# Patient Record
Sex: FEMALE | Race: WHITE | Employment: FULL TIME | URBAN - METROPOLITAN AREA
[De-identification: names, ages, dates, MRNs, and addresses within clinical notes are randomized per-mention and may not be internally consistent; named-entity substitution may affect disease eponyms.]

---

## 2023-07-30 ENCOUNTER — APPOINTMENT (OUTPATIENT)
Dept: CT IMAGING | Age: 63
End: 2023-07-30
Payer: COMMERCIAL

## 2023-07-30 ENCOUNTER — HOSPITAL ENCOUNTER (OUTPATIENT)
Age: 63
Setting detail: OBSERVATION
Discharge: HOME | End: 2023-07-31
Attending: EMERGENCY MEDICINE | Admitting: INTERNAL MEDICINE
Payer: COMMERCIAL

## 2023-07-30 DIAGNOSIS — R00.2 PALPITATIONS: ICD-10-CM

## 2023-07-30 DIAGNOSIS — R55 SYNCOPE AND COLLAPSE: Primary | ICD-10-CM

## 2023-07-30 LAB
ALBUMIN SERPL-MCNC: 4 G/DL (ref 3.4–5)
ALBUMIN/GLOB SERPL: 1.4 {RATIO} (ref 1.1–2.2)
ALP SERPL-CCNC: 94 U/L (ref 40–129)
ALT SERPL-CCNC: 10 U/L (ref 10–40)
ANION GAP SERPL CALCULATED.3IONS-SCNC: 10 MMOL/L (ref 3–16)
AST SERPL-CCNC: 16 U/L (ref 15–37)
BASOPHILS # BLD: 0 K/UL (ref 0–0.2)
BASOPHILS NFR BLD: 0.3 %
BILIRUB SERPL-MCNC: 0.6 MG/DL (ref 0–1)
BUN SERPL-MCNC: 18 MG/DL (ref 7–20)
CALCIUM SERPL-MCNC: 8.9 MG/DL (ref 8.3–10.6)
CHLORIDE SERPL-SCNC: 105 MMOL/L (ref 99–110)
CO2 SERPL-SCNC: 24 MMOL/L (ref 21–32)
CREAT SERPL-MCNC: 0.7 MG/DL (ref 0.6–1.2)
D DIMER: 0.63 UG/ML FEU (ref 0–0.6)
DEPRECATED RDW RBC AUTO: 13.2 % (ref 12.4–15.4)
EOSINOPHIL # BLD: 0 K/UL (ref 0–0.6)
EOSINOPHIL NFR BLD: 0.2 %
GFR SERPLBLD CREATININE-BSD FMLA CKD-EPI: >60 ML/MIN/{1.73_M2}
GLUCOSE SERPL-MCNC: 148 MG/DL (ref 70–99)
HCT VFR BLD AUTO: 45.3 % (ref 36–48)
HGB BLD-MCNC: 15.2 G/DL (ref 12–16)
LYMPHOCYTES # BLD: 1.8 K/UL (ref 1–5.1)
LYMPHOCYTES NFR BLD: 15.5 %
MCH RBC QN AUTO: 29.7 PG (ref 26–34)
MCHC RBC AUTO-ENTMCNC: 33.6 G/DL (ref 31–36)
MCV RBC AUTO: 88.4 FL (ref 80–100)
MONOCYTES # BLD: 0.8 K/UL (ref 0–1.3)
MONOCYTES NFR BLD: 6.6 %
NEUTROPHILS # BLD: 8.8 K/UL (ref 1.7–7.7)
NEUTROPHILS NFR BLD: 77.4 %
NT-PROBNP SERPL-MCNC: 889 PG/ML (ref 0–124)
PLATELET # BLD AUTO: 308 K/UL (ref 135–450)
PMV BLD AUTO: 8.7 FL (ref 5–10.5)
POTASSIUM SERPL-SCNC: 4.2 MMOL/L (ref 3.5–5.1)
PROT SERPL-MCNC: 6.8 G/DL (ref 6.4–8.2)
RBC # BLD AUTO: 5.13 M/UL (ref 4–5.2)
SODIUM SERPL-SCNC: 139 MMOL/L (ref 136–145)
TROPONIN, HIGH SENSITIVITY: 7 NG/L (ref 0–14)
TSH SERPL DL<=0.005 MIU/L-ACNC: 3.6 UIU/ML (ref 0.27–4.2)
WBC # BLD AUTO: 11.3 K/UL (ref 4–11)

## 2023-07-30 PROCEDURE — 84484 ASSAY OF TROPONIN QUANT: CPT

## 2023-07-30 PROCEDURE — 99285 EMERGENCY DEPT VISIT HI MDM: CPT

## 2023-07-30 PROCEDURE — G0378 HOSPITAL OBSERVATION PER HR: HCPCS

## 2023-07-30 PROCEDURE — 96361 HYDRATE IV INFUSION ADD-ON: CPT

## 2023-07-30 PROCEDURE — 71260 CT THORAX DX C+: CPT

## 2023-07-30 PROCEDURE — 6360000004 HC RX CONTRAST MEDICATION: Performed by: EMERGENCY MEDICINE

## 2023-07-30 PROCEDURE — 84443 ASSAY THYROID STIM HORMONE: CPT

## 2023-07-30 PROCEDURE — 96360 HYDRATION IV INFUSION INIT: CPT

## 2023-07-30 PROCEDURE — 6360000002 HC RX W HCPCS: Performed by: INTERNAL MEDICINE

## 2023-07-30 PROCEDURE — 93005 ELECTROCARDIOGRAM TRACING: CPT | Performed by: EMERGENCY MEDICINE

## 2023-07-30 PROCEDURE — 80053 COMPREHEN METABOLIC PANEL: CPT

## 2023-07-30 PROCEDURE — 83036 HEMOGLOBIN GLYCOSYLATED A1C: CPT

## 2023-07-30 PROCEDURE — 85025 COMPLETE CBC W/AUTO DIFF WBC: CPT

## 2023-07-30 PROCEDURE — 83880 ASSAY OF NATRIURETIC PEPTIDE: CPT

## 2023-07-30 PROCEDURE — 2580000003 HC RX 258: Performed by: INTERNAL MEDICINE

## 2023-07-30 PROCEDURE — 96372 THER/PROPH/DIAG INJ SC/IM: CPT

## 2023-07-30 PROCEDURE — 85379 FIBRIN DEGRADATION QUANT: CPT

## 2023-07-30 RX ORDER — ONDANSETRON 2 MG/ML
4 INJECTION INTRAMUSCULAR; INTRAVENOUS EVERY 6 HOURS PRN
Status: DISCONTINUED | OUTPATIENT
Start: 2023-07-30 | End: 2023-07-31 | Stop reason: HOSPADM

## 2023-07-30 RX ORDER — ENOXAPARIN SODIUM 100 MG/ML
30 INJECTION SUBCUTANEOUS 2 TIMES DAILY
Status: DISCONTINUED | OUTPATIENT
Start: 2023-07-30 | End: 2023-07-31 | Stop reason: HOSPADM

## 2023-07-30 RX ORDER — SODIUM CHLORIDE, SODIUM LACTATE, POTASSIUM CHLORIDE, CALCIUM CHLORIDE 600; 310; 30; 20 MG/100ML; MG/100ML; MG/100ML; MG/100ML
INJECTION, SOLUTION INTRAVENOUS CONTINUOUS
Status: DISCONTINUED | OUTPATIENT
Start: 2023-07-30 | End: 2023-07-31 | Stop reason: HOSPADM

## 2023-07-30 RX ORDER — POLYETHYLENE GLYCOL 3350 17 G/17G
17 POWDER, FOR SOLUTION ORAL DAILY PRN
Status: DISCONTINUED | OUTPATIENT
Start: 2023-07-30 | End: 2023-07-31 | Stop reason: HOSPADM

## 2023-07-30 RX ORDER — ONDANSETRON 4 MG/1
4 TABLET, ORALLY DISINTEGRATING ORAL EVERY 8 HOURS PRN
Status: DISCONTINUED | OUTPATIENT
Start: 2023-07-30 | End: 2023-07-31 | Stop reason: HOSPADM

## 2023-07-30 RX ORDER — SODIUM CHLORIDE 9 MG/ML
INJECTION, SOLUTION INTRAVENOUS PRN
Status: DISCONTINUED | OUTPATIENT
Start: 2023-07-30 | End: 2023-07-31 | Stop reason: HOSPADM

## 2023-07-30 RX ORDER — ACETAMINOPHEN 325 MG/1
650 TABLET ORAL EVERY 6 HOURS PRN
Status: DISCONTINUED | OUTPATIENT
Start: 2023-07-30 | End: 2023-07-31 | Stop reason: HOSPADM

## 2023-07-30 RX ORDER — ACETAMINOPHEN 650 MG/1
650 SUPPOSITORY RECTAL EVERY 6 HOURS PRN
Status: DISCONTINUED | OUTPATIENT
Start: 2023-07-30 | End: 2023-07-31 | Stop reason: HOSPADM

## 2023-07-30 RX ORDER — SODIUM CHLORIDE 0.9 % (FLUSH) 0.9 %
5-40 SYRINGE (ML) INJECTION PRN
Status: DISCONTINUED | OUTPATIENT
Start: 2023-07-30 | End: 2023-07-31 | Stop reason: HOSPADM

## 2023-07-30 RX ORDER — LISINOPRIL AND HYDROCHLOROTHIAZIDE 25; 20 MG/1; MG/1
1 TABLET ORAL DAILY
COMMUNITY
Start: 2023-07-16

## 2023-07-30 RX ORDER — SODIUM CHLORIDE 0.9 % (FLUSH) 0.9 %
5-40 SYRINGE (ML) INJECTION EVERY 12 HOURS SCHEDULED
Status: DISCONTINUED | OUTPATIENT
Start: 2023-07-30 | End: 2023-07-31 | Stop reason: HOSPADM

## 2023-07-30 RX ADMIN — IOPAMIDOL 75 ML: 755 INJECTION, SOLUTION INTRAVENOUS at 11:18

## 2023-07-30 RX ADMIN — SODIUM CHLORIDE, POTASSIUM CHLORIDE, SODIUM LACTATE AND CALCIUM CHLORIDE: 600; 310; 30; 20 INJECTION, SOLUTION INTRAVENOUS at 15:37

## 2023-07-30 RX ADMIN — ENOXAPARIN SODIUM 30 MG: 100 INJECTION SUBCUTANEOUS at 21:39

## 2023-07-30 ASSESSMENT — PAIN - FUNCTIONAL ASSESSMENT: PAIN_FUNCTIONAL_ASSESSMENT: NONE - DENIES PAIN

## 2023-07-30 NOTE — ED NOTES
ED SBAR report provider to Adriel Hwang, [address] [place] [address]. Patient to be transported to Room 4125 via stretcher by ED tech. Patient transported with bedside cardiac monitor. IV site clean, dry, and intact. MEWS score and pain assessed and documented. Patient's score on the KNIGHT Fall scale reviewed with receiving RN. Updated patient and family on plan of care.       Gabrielle Brice RN  07/30/23 0199

## 2023-07-30 NOTE — ED TRIAGE NOTES
Pt arrived from home via private vehicle after a syncopal episode at home. Pt denies falling. Pt states she felt faint and sat down, her  witnessed. Hx HTN, did not take her lisinopril yes today. A&Ox4, ambulatory. Respirations even, easy, unlabored. Pt states she felt like her heart was racing last night but not this morning.

## 2023-07-30 NOTE — PROGRESS NOTES
Admitted patient to room  from the emergency department. Oriented to room, call light, tv, phone and dietary services. Respirations easy unlabored, denies pain. Bed in lowest position and locked. Exit alarms in place. Non slip socks on. ID bracelet on and correct per patient verbally reporting name and date of birth. Call light and needed items in reach.

## 2023-07-30 NOTE — ED NOTES
SBAR received from Belkys Carrasco RN. Assumed care of pt for lunch relief.        Giovanny Steward RN  07/30/23 0796

## 2023-07-30 NOTE — ED PROVIDER NOTES
[facility] ED    CHIEF COMPLAINT  Loss of Consciousness (Syncopal episode in the bathroom, pt denies falling)       HISTORY OF PRESENT ILLNESS  Gilbert Burt is a 63 y.o. female who presents to the ED for evaluation after a syncopal episode. Patient reports sensation of palpitations last night. Awoke and still had the sense of palpitations. Took a shower. While in the shower, felt as if she might pass out. Got out of the shower, called her  for help. Lost consciousness and was assisted to the ground by her . Denies head injury, neck pain, weakness, numbness. She denies any chest pain, shortness of breath, nausea, vomiting, diarrhea, abdominal pain, leg swelling, or calf pain. She thinks she has remained well-hydrated. She had a previous syncopal episode last year while in [employer]. Attributed it to being warm in [employer] and standing. History of hypertension. Denies any other cardiac history. I have reviewed the following from the nursing documentation:    Past Medical History:   Diagnosis Date    Hypertension      History reviewed. No pertinent surgical history. History reviewed. No pertinent family history.   Social History     Socioeconomic History    Marital status:      Spouse name: Not on file    Number of children: Not on file    Years of education: Not on file    Highest education level: Not on file   Occupational History    Not on file   Tobacco Use    Smoking status: Never    Smokeless tobacco: Never   Substance and Sexual Activity    Alcohol use: Yes     Comment: rarely    Drug use: Never    Sexual activity: Not on file   Other Topics Concern    Not on file   Social History Narrative    Not on file     Social Determinants of Health     Financial Resource Strain: Not on file   Food Insecurity: Not on file   Transportation Needs: Not on file   Physical Activity: Not on file   Stress: Not on file   Social Connections: Not on file   Intimate Partner Violence: Not on file

## 2023-07-31 ENCOUNTER — TELEPHONE (OUTPATIENT)
Dept: CARDIOLOGY CLINIC | Age: 63
End: 2023-07-31

## 2023-07-31 VITALS
OXYGEN SATURATION: 96 % | DIASTOLIC BLOOD PRESSURE: 79 MMHG | RESPIRATION RATE: 16 BRPM | HEART RATE: 72 BPM | SYSTOLIC BLOOD PRESSURE: 133 MMHG | TEMPERATURE: 98.2 F | WEIGHT: 231.92 LBS

## 2023-07-31 LAB
ALBUMIN SERPL-MCNC: 3.4 G/DL (ref 3.4–5)
ANION GAP SERPL CALCULATED.3IONS-SCNC: 5 MMOL/L (ref 3–16)
BASOPHILS # BLD: 0 K/UL (ref 0–0.2)
BASOPHILS NFR BLD: 0.1 %
BUN SERPL-MCNC: 21 MG/DL (ref 7–20)
CALCIUM SERPL-MCNC: 8.9 MG/DL (ref 8.3–10.6)
CHLORIDE SERPL-SCNC: 104 MMOL/L (ref 99–110)
CO2 SERPL-SCNC: 29 MMOL/L (ref 21–32)
CREAT SERPL-MCNC: 0.7 MG/DL (ref 0.6–1.2)
DEPRECATED RDW RBC AUTO: 13.2 % (ref 12.4–15.4)
EKG ATRIAL RATE: 70 BPM
EKG DIAGNOSIS: NORMAL
EKG P AXIS: 0 DEGREES
EKG P-R INTERVAL: 134 MS
EKG Q-T INTERVAL: 354 MS
EKG QRS DURATION: 90 MS
EKG QTC CALCULATION (BAZETT): 382 MS
EKG R AXIS: 41 DEGREES
EKG T AXIS: 9 DEGREES
EKG VENTRICULAR RATE: 70 BPM
EOSINOPHIL # BLD: 0 K/UL (ref 0–0.6)
EOSINOPHIL NFR BLD: 0.4 %
EST. AVERAGE GLUCOSE BLD GHB EST-MCNC: 111.2 MG/DL
GFR SERPLBLD CREATININE-BSD FMLA CKD-EPI: >60 ML/MIN/{1.73_M2}
GLUCOSE SERPL-MCNC: 112 MG/DL (ref 70–99)
HBA1C MFR BLD: 5.5 %
HCT VFR BLD AUTO: 40.6 % (ref 36–48)
HGB BLD-MCNC: 14.1 G/DL (ref 12–16)
LV EF: 63 %
LVEF MODALITY: NORMAL
LYMPHOCYTES # BLD: 1.9 K/UL (ref 1–5.1)
LYMPHOCYTES NFR BLD: 19.9 %
MAGNESIUM SERPL-MCNC: 2.3 MG/DL (ref 1.8–2.4)
MCH RBC QN AUTO: 30.6 PG (ref 26–34)
MCHC RBC AUTO-ENTMCNC: 34.7 G/DL (ref 31–36)
MCV RBC AUTO: 88.2 FL (ref 80–100)
MONOCYTES # BLD: 0.8 K/UL (ref 0–1.3)
MONOCYTES NFR BLD: 8 %
NEUTROPHILS # BLD: 6.8 K/UL (ref 1.7–7.7)
NEUTROPHILS NFR BLD: 71.6 %
PHOSPHATE SERPL-MCNC: 3.5 MG/DL (ref 2.5–4.9)
PLATELET # BLD AUTO: 224 K/UL (ref 135–450)
PMV BLD AUTO: 8.7 FL (ref 5–10.5)
POTASSIUM SERPL-SCNC: 4.7 MMOL/L (ref 3.5–5.1)
RBC # BLD AUTO: 4.6 M/UL (ref 4–5.2)
SODIUM SERPL-SCNC: 138 MMOL/L (ref 136–145)
WBC # BLD AUTO: 9.5 K/UL (ref 4–11)

## 2023-07-31 PROCEDURE — 36415 COLL VENOUS BLD VENIPUNCTURE: CPT

## 2023-07-31 PROCEDURE — 93010 ELECTROCARDIOGRAM REPORT: CPT | Performed by: INTERNAL MEDICINE

## 2023-07-31 PROCEDURE — 96372 THER/PROPH/DIAG INJ SC/IM: CPT

## 2023-07-31 PROCEDURE — 83735 ASSAY OF MAGNESIUM: CPT

## 2023-07-31 PROCEDURE — 94760 N-INVAS EAR/PLS OXIMETRY 1: CPT

## 2023-07-31 PROCEDURE — 6360000002 HC RX W HCPCS: Performed by: INTERNAL MEDICINE

## 2023-07-31 PROCEDURE — 85025 COMPLETE CBC W/AUTO DIFF WBC: CPT

## 2023-07-31 PROCEDURE — 2580000003 HC RX 258: Performed by: INTERNAL MEDICINE

## 2023-07-31 PROCEDURE — 96361 HYDRATE IV INFUSION ADD-ON: CPT

## 2023-07-31 PROCEDURE — G0378 HOSPITAL OBSERVATION PER HR: HCPCS

## 2023-07-31 PROCEDURE — 93306 TTE W/DOPPLER COMPLETE: CPT

## 2023-07-31 PROCEDURE — 80069 RENAL FUNCTION PANEL: CPT

## 2023-07-31 PROCEDURE — 99223 1ST HOSP IP/OBS HIGH 75: CPT | Performed by: INTERNAL MEDICINE

## 2023-07-31 RX ADMIN — ENOXAPARIN SODIUM 30 MG: 100 INJECTION SUBCUTANEOUS at 09:15

## 2023-07-31 RX ADMIN — SODIUM CHLORIDE, POTASSIUM CHLORIDE, SODIUM LACTATE AND CALCIUM CHLORIDE: 600; 310; 30; 20 INJECTION, SOLUTION INTRAVENOUS at 03:21

## 2023-07-31 NOTE — PLAN OF CARE
Problem: Discharge Planning  Goal: Discharge to home or other facility with appropriate resources  7/31/2023 1808 by Jo Ann Kaur RN  Outcome: Completed  7/31/2023 1021 by Jo Ann Kaur RN  Outcome: Progressing

## 2023-07-31 NOTE — PLAN OF CARE
Problem: Discharge Planning  Goal: Discharge to home or other facility with appropriate resources  7/31/2023 1021 by Heriberto Saeed RN  Outcome: Progressing  7/31/2023 0042 by Shekhar Carlin RN  Outcome: Progressing

## 2023-07-31 NOTE — TELEPHONE ENCOUNTER
30 day event monitor placed for patient. Monitor placed by J[address]  Length of monitor 30 days  Monitor ordered by [facility]  Serial number [id number]  Kit ID [id number]  Activation successful prior to pt leaving hospital? Yes       Please add to spreadsheet

## 2023-08-03 ENCOUNTER — TELEPHONE (OUTPATIENT)
Dept: CARDIOLOGY CLINIC | Age: 63
End: 2023-08-03

## 2023-08-03 NOTE — TELEPHONE ENCOUNTER
Received urgent biotel report 08/02/2023 @ 20:02,    Gave to Dr. Pankaj Enriquez for review and interpretation.    - Patient has a [id number] Score 2 (HTN, GENDER)

## 2023-08-03 NOTE — TELEPHONE ENCOUNTER
Spoke with patient's  advised of new onset atrial fibrillation on monitor.      He will let her know and I will call her back tomorrow am on her cell [phone]

## 2023-08-03 NOTE — TELEPHONE ENCOUNTER
Dr. Winston Jones reviewed. Sustained atrial fibrillation. Schedule appointment with Dr. Cecilia Lott.

## 2023-08-04 RX ORDER — METOPROLOL SUCCINATE 25 MG/1
25 TABLET, EXTENDED RELEASE ORAL DAILY
Qty: 30 TABLET | Refills: 5 | Status: SHIPPED | OUTPATIENT
Start: 2023-08-04

## 2023-08-04 NOTE — TELEPHONE ENCOUNTER
Called and spoke with patient. Advised of Dr. Arellano Samples orders. Educated on Toprol XL and Eliquis    RX sent to [facility]. Samples prepared for .

## 2023-08-04 NOTE — TELEPHONE ENCOUNTER
I called and spoke with patient discussed new onset atrial fibrillation seen on monitor. She has a [id number] Score 2 (HTN, GENDER)  Monitor showed atrial fibrillation with RVR v-rate 195 bpm.    Patient is schedule for follow up on 09/13. Please advise if you would like to start any medication prior to her appointment.

## 2023-08-15 ENCOUNTER — TELEPHONE (OUTPATIENT)
Dept: CARDIOLOGY CLINIC | Age: 63
End: 2023-08-15

## 2023-08-15 NOTE — TELEPHONE ENCOUNTER
Rec'd Urgent report fax from [facility]. Showed to Richard. Called patient. She said that her heart was feeling fast most of the day. She went shopping after work. Around 7:30 PM her heart was racing and she was sweating a lot because of the heat and humidity. She is feeling better now.

## 2023-08-16 ENCOUNTER — OFFICE VISIT (OUTPATIENT)
Dept: CARDIOLOGY CLINIC | Age: 63
End: 2023-08-16
Payer: COMMERCIAL

## 2023-08-16 VITALS
OXYGEN SATURATION: 99 % | HEIGHT: 63 IN | BODY MASS INDEX: 42.17 KG/M2 | SYSTOLIC BLOOD PRESSURE: 108 MMHG | HEART RATE: 86 BPM | WEIGHT: 238 LBS | DIASTOLIC BLOOD PRESSURE: 66 MMHG

## 2023-08-16 DIAGNOSIS — R55 SYNCOPE AND COLLAPSE: Primary | ICD-10-CM

## 2023-08-16 DIAGNOSIS — I48.0 PAROXYSMAL ATRIAL FIBRILLATION (HCC): ICD-10-CM

## 2023-08-16 PROCEDURE — 93000 ELECTROCARDIOGRAM COMPLETE: CPT | Performed by: INTERNAL MEDICINE

## 2023-08-16 PROCEDURE — 99215 OFFICE O/P EST HI 40 MIN: CPT | Performed by: INTERNAL MEDICINE

## 2023-08-16 RX ORDER — FLECAINIDE ACETATE 50 MG/1
50 TABLET ORAL 2 TIMES DAILY
Qty: 180 TABLET | Refills: 1 | Status: SHIPPED | OUTPATIENT
Start: 2023-08-16

## 2023-08-16 RX ORDER — METOPROLOL SUCCINATE 25 MG/1
12.5 TABLET, EXTENDED RELEASE ORAL DAILY
Qty: 30 TABLET | Refills: 5 | Status: SHIPPED | OUTPATIENT
Start: 2023-08-16

## 2023-08-16 NOTE — PATIENT INSTRUCTIONS
If you have any question regarding your ablation please contact Dr. Shira Fishman Nurse at [phone]. Our  [facility] will call to schedule your procedure    Atrial Fibrillation Ablation Pre procedure Instructions    Date:______________________________    Arrive at:__________________________    Procedure time:____________________    The morning of your procedure you will park in the hospital parking lot and report directly to the cath lab to check in. At the information desk stay right and go all the way to the end of the foster, this will take you directly to your check in desk for the cath lab. Pre-Procedure Instructions   You will need to fast (nothing to eat or drink) after midnight the day of your procedure. Do NOT chew gum or eat mints the day of your procedure  You will need to hold your Eliquis the night before and the morning of the procedure. You will need to hold your blood pressure medications (Lisinopril-HCTZ) the morning of the procedure. Do not use any lotions, creams or perfume the morning of procedure. Please have a responsible adult to drive you home after procedure, you should go home same day, but there is always a possibility of an overnight stay. Cath lab will provide you with all post procedure instructions  Follow up one month with Dr. Alycia Baird after the procedure and three months with Job Danielle CNP after procedure                                          [facility] services  [address] [place] [address]   [facility], [address] [facility]  Phone: [phone]  The hours are Mon -Fri.   6:30 am - 4:00 pm   Saturday 8:00 am - noon  No appointment necessary

## 2023-08-16 NOTE — PROGRESS NOTES
palpitations with dizziness and diaphoresis on 8/14/2023 at the time when the urgent report from the event monitor she is currently wearing shows atrial fibrillation with a ventricular rate of 195 bpm.  This is a new diagnosis for her though palpitations are not new. She got the event monitor after she presented to the hospital with syncope and noted palpitations preceding the syncope. She was discharged on metoprolol which she is currently taking. Discussed pathophysiology of atrial fibrillation, treatment options including medication and ablation with their risks and benefits. We will start flecainide 50 mg twice daily and cut down the metoprolol to 12.5 mg daily. She will continue wearing the monitor to assess effectiveness of antiarrhythmics. We also agreed to proceed with atrial fibrillation ablation as detailed below. She will then continue flecainide for 3 months post ablation at which time we can stop the antiarrhythmic and assess if she has recurrence. Continue anticoagulation for thromboembolic risk reduction which was started after A-fib was noted on the monitor. She is tolerating this medication well. - Afib risk factors including age, HTN, obesity, inactivity and YUE were discussed with patient. Risk factor modification recommended    - Treatment options including cardioversion, rate control strategy, antiarrhythmics, anticoagulation and possible ablation were discussed with patient. Risks, benefits and alternative of each treatment options were explained. All questions answered                          ~ Information given regarding ablation for pt to review                          ~ The risks, benefits and alternatives of the ablation procedure were discussed with the patient.  The risks including, but not limited to, the risks of bleeding, infection, radiation exposure, injury to vascular, cardiac and surrounding structures (including pneumothorax), stroke, cardiac perforation,

## 2023-08-18 ENCOUNTER — TELEPHONE (OUTPATIENT)
Dept: CARDIOLOGY CLINIC | Age: 63
End: 2023-08-18

## 2023-08-18 NOTE — TELEPHONE ENCOUNTER
Spoke with the patient to let her know I don't have any dates for Dr Timoteo Barajas at [facility] currently. As soon as I have more dates I will call her and get her scheduled. She verbalized understanding. Procedure- MADHAV/Afib Ablation  Date:   Arrival time:   Procedure time:       Patient Instructions  Dx:PAF                        ICD-10 code: I48.0     The morning of your procedure you will park in the hospital parking lot and report directly to the cath lab to check in. At the information desk stay right and go all the way to the end of the foster, this will take you directly to your check in desk for the cath lab. Pre-Procedure Instructions   You will need to fast (nothing to eat or drink) after midnight the day of your procedure. Do NOT chew gum or eat mints the day of your procedure  You will need to hold your Eliquis the night before and the morning of the procedure. You will need to hold your blood pressure medications (Lisinopril-HCTZ) the morning of the procedure. Do not use any lotions, creams or perfume the morning of procedure. Please have a responsible adult to drive you home after procedure, you should go home same day, but there is always a possibility of an overnight stay.   Cath lab will provide you with all post procedure instructions  Follow up one month with Dr. Timoteo Barajas after the procedure and three months with Shabnam Benz CNP after procedure

## 2023-08-23 NOTE — TELEPHONE ENCOUNTER
Spoke with the patient and she is not avail for the 8/31 time slot for procedure. She wants me to hold and keep checking back with her if anything else becomes available.

## 2023-08-28 NOTE — TELEPHONE ENCOUNTER
Spoke with the patient to get her scheduled for procedure. She wants to move up to earlier date if its available. I will call her back as I was just given a list of dates. We went over instructions below and she verbalized understanding. Procedure- MADHAV/Afib Ablation  Date: 10/5/2023    Arrival time: 6:45 am   Procedure time: 7:30 am         Patient Instructions  Dx:PAF                        ICD-10 code: I48.0     The morning of your procedure you will park in the hospital parking lot and report directly to the cath lab to check in. At the information desk stay right and go all the way to the end of the foster, this will take you directly to your check in desk for the cath lab. Pre-Procedure Instructions   You will need to fast (nothing to eat or drink) after midnight the day of your procedure. Do NOT chew gum or eat mints the day of your procedure  You will need to hold your Eliquis the night before and the morning of the procedure. You will need to hold your blood pressure medications (Lisinopril-HCTZ) the morning of the procedure. Do not use any lotions, creams or perfume the morning of procedure. Please have a responsible adult to drive you home after procedure, you should go home same day, but there is always a possibility of an overnight stay.   Cath lab will provide you with all post procedure instructions  Follow up one month with Dr. Katherine Mccrary after the procedure and three months with Otoniel Mehta CNP after procedure    Qgenda updated / Added in [facility] / emailed cath lab

## 2023-08-30 ENCOUNTER — TELEPHONE (OUTPATIENT)
Dept: CARDIOLOGY CLINIC | Age: 63
End: 2023-08-30

## 2023-08-30 RX ORDER — METOPROLOL SUCCINATE 25 MG/1
25 TABLET, EXTENDED RELEASE ORAL DAILY
Qty: 30 TABLET | Refills: 5
Start: 2023-08-30

## 2023-08-30 NOTE — TELEPHONE ENCOUNTER
LVM for patient to see if she can move up to cancellation spot. That spot is on 9/21. I will need to speak with the patient.

## 2023-08-30 NOTE — TELEPHONE ENCOUNTER
Received urgent biotel report showing atrial fibrillation with RVR. Dr. Mookie Anthony reviewed. Orders received to increase Metoprolol to 25 mg daily and to se if there is an earlier date for her to have ablation. Spoke with patient advised of findings on monitor. Instructed to increase Toprol XL to 25 mg daily and that if there is an early date available for her ablation our  [facility] will be in contact. Verbalized understanding.

## 2023-08-31 NOTE — TELEPHONE ENCOUNTER
Spoke with the patient and got her procedure moved up to a cancellation spot. She verbalized undertanding and thankful.         Procedure- MADHAV/Afib Ablation  Date: 9/212023    Arrival time: 6:45 am   Procedure time: 7:30 am       Ashly updated / Updated Ba[facility] / emailed cath lab

## 2023-09-06 PROCEDURE — 93228 REMOTE 30 DAY ECG REV/REPORT: CPT | Performed by: INTERNAL MEDICINE

## 2023-09-08 ENCOUNTER — TELEPHONE (OUTPATIENT)
Dept: CARDIOLOGY CLINIC | Age: 63
End: 2023-09-08

## 2023-09-08 DIAGNOSIS — R55 SYNCOPE AND COLLAPSE: Primary | ICD-10-CM

## 2023-09-08 NOTE — TELEPHONE ENCOUNTER
Spoke with patient notified of monitor results verbalized understanding. The monitor was worn from 07/31/2023-08/29/2023  7% atrial fibrillation, prior symptoms of presyncope with atrial fibrillation with RVR. Schedule for ablation on 09/21/2023. Marcus Rosario

## 2023-09-13 ENCOUNTER — OFFICE VISIT (OUTPATIENT)
Dept: SLEEP MEDICINE | Age: 63
End: 2023-09-13
Payer: COMMERCIAL

## 2023-09-13 VITALS
WEIGHT: 238.2 LBS | BODY MASS INDEX: 42.21 KG/M2 | SYSTOLIC BLOOD PRESSURE: 124 MMHG | TEMPERATURE: 98.1 F | HEART RATE: 64 BPM | DIASTOLIC BLOOD PRESSURE: 70 MMHG | RESPIRATION RATE: 98 BRPM | HEIGHT: 63 IN

## 2023-09-13 DIAGNOSIS — E66.01 CLASS 3 SEVERE OBESITY DUE TO EXCESS CALORIES WITH SERIOUS COMORBIDITY AND BODY MASS INDEX (BMI) OF 40.0 TO 44.9 IN ADULT (HCC): ICD-10-CM

## 2023-09-13 DIAGNOSIS — R06.83 SNORING: ICD-10-CM

## 2023-09-13 DIAGNOSIS — I10 PRIMARY HYPERTENSION: ICD-10-CM

## 2023-09-13 DIAGNOSIS — Z86.79 HISTORY OF ATRIAL FIBRILLATION: ICD-10-CM

## 2023-09-13 DIAGNOSIS — G47.33 OSA (OBSTRUCTIVE SLEEP APNEA): Primary | ICD-10-CM

## 2023-09-13 PROCEDURE — 99204 OFFICE O/P NEW MOD 45 MIN: CPT | Performed by: PSYCHIATRY & NEUROLOGY

## 2023-09-13 PROCEDURE — 3074F SYST BP LT 130 MM HG: CPT | Performed by: PSYCHIATRY & NEUROLOGY

## 2023-09-13 PROCEDURE — 3078F DIAST BP <80 MM HG: CPT | Performed by: PSYCHIATRY & NEUROLOGY

## 2023-09-13 ASSESSMENT — SLEEP AND FATIGUE QUESTIONNAIRES
HOW LIKELY ARE YOU TO NOD OFF OR FALL ASLEEP IN A CAR, WHILE STOPPED FOR A FEW MINUTES IN TRAFFIC: 0
HOW LIKELY ARE YOU TO NOD OFF OR FALL ASLEEP WHILE SITTING INACTIVE IN A PUBLIC PLACE: 0
ESS TOTAL SCORE: 6
HOW LIKELY ARE YOU TO NOD OFF OR FALL ASLEEP WHILE SITTING QUIETLY AFTER LUNCH WITHOUT ALCOHOL: 0
NECK CIRCUMFERENCE (INCHES): 14.5
HOW LIKELY ARE YOU TO NOD OFF OR FALL ASLEEP WHILE LYING DOWN TO REST IN THE AFTERNOON WHEN CIRCUMSTANCES PERMIT: 3
HOW LIKELY ARE YOU TO NOD OFF OR FALL ASLEEP WHILE SITTING AND READING: 1
HOW LIKELY ARE YOU TO NOD OFF OR FALL ASLEEP WHILE WATCHING TV: 1
HOW LIKELY ARE YOU TO NOD OFF OR FALL ASLEEP WHEN YOU ARE A PASSENGER IN A CAR FOR AN HOUR WITHOUT A BREAK: 1
HOW LIKELY ARE YOU TO NOD OFF OR FALL ASLEEP WHILE SITTING AND TALKING TO SOMEONE: 0

## 2023-09-13 ASSESSMENT — ENCOUNTER SYMPTOMS
EYES NEGATIVE: 1
ALLERGIC/IMMUNOLOGIC NEGATIVE: 1
GASTROINTESTINAL NEGATIVE: 1
SORE THROAT: 1
RESPIRATORY NEGATIVE: 1

## 2023-09-13 NOTE — PATIENT INSTRUCTIONS
Orders Placed This Encounter   Procedures    Baseline Diagnostic Sleep Study     Standing Status:   Future     Standing Expiration Date:   9/13/2024     Order Specific Question:   Adult or Pediatric     Answer:   Adult Study (>7 Years)     Order Specific Question:   Location For Sleep Study     Answer:   [facility]     Order Specific Question:   Select Sleep Lab Location     Answer:   [facility]    Sleep Study with PAP Titration     Standing Status:   Future     Standing Expiration Date:   9/13/2024     Order Specific Question:   Sleep Study Titration Type     Answer:   CPAP     Order Specific Question:   Location For Sleep Study     Answer:   [facility]     Order Specific Question:   Select Sleep Lab Location     Answer:   [facility]

## 2023-09-13 NOTE — PROGRESS NOTES
MD KEVIN Mobley Board Certified in Sleep Medicine  Certified [facility] Sleep Medicine  Board Certified in Neurology [facility] [address]  [address] [facility]  [address], [facility] [address]           [address] [address] [place] [address]  [phone]    Subjective:     Patient ID: Deidre Reilly is a 66 y.o. female. Chief Complaint   Patient presents with    New Patient       HPI:        Deidre Reilly is a 66 y.o. female referred by Dr. Bacilio Solorzano for a sleep evaluation. She complains of snoring, tossing and turning, take naps during the day but she denies snorting, choking, periods of not breathing, knees buckling with laughing, completely or partially paralyzed while falling asleep or waking up, noisy environment, uncomfortable room temperature, uncomfortable bedding. Symptoms began a few years ago, unchanged since that time. The patient's bed-partner confirmed the snoring without the stopped breathing at night  SLEEP SCHEDULE: Goes to bed around 11 PM in the weekdays and 11:30 PM in the weekends. It usually takes the patient 10-15 minutes to fall asleep. The patient gets up 1-2 per night to go to the bathroom. The Patient finally gets up at 7 AM during the weekdays and 8 AM in the weekends. patient wakes up with dry mouth. The patient has restless sleep with frequent arousals in addition to the Patient has significant daytime sleepiness. The Patient scored [place] Sleepiness Score: 6 on [place] Sleepiness Scale ( more than 10 is indicative of daytime sleepiness)and 19 in fatigue scale ( more than 36 is indicative of daytime fatigue). The patient takes 1-2 naps/week for 60 minutes and usually is refreshing nap. Previous evaluation and treatment has included- none.     The

## 2023-09-19 ENCOUNTER — ANESTHESIA EVENT (OUTPATIENT)
Dept: CARDIAC CATH/INVASIVE PROCEDURES | Age: 63
End: 2023-09-19
Payer: COMMERCIAL

## 2023-09-21 ENCOUNTER — ANESTHESIA (OUTPATIENT)
Dept: CARDIAC CATH/INVASIVE PROCEDURES | Age: 63
End: 2023-09-21
Payer: COMMERCIAL

## 2023-09-21 ENCOUNTER — HOSPITAL ENCOUNTER (OUTPATIENT)
Dept: CARDIAC CATH/INVASIVE PROCEDURES | Age: 63
Discharge: HOME | End: 2023-09-21
Attending: INTERNAL MEDICINE | Admitting: INTERNAL MEDICINE
Payer: COMMERCIAL

## 2023-09-21 VITALS
SYSTOLIC BLOOD PRESSURE: 93 MMHG | TEMPERATURE: 97.1 F | DIASTOLIC BLOOD PRESSURE: 57 MMHG | BODY MASS INDEX: 42.17 KG/M2 | HEART RATE: 77 BPM | WEIGHT: 238 LBS | RESPIRATION RATE: 17 BRPM | OXYGEN SATURATION: 98 % | HEIGHT: 63 IN

## 2023-09-21 LAB
ABO + RH BLD: NORMAL
ANION GAP SERPL CALCULATED.3IONS-SCNC: 9 MMOL/L (ref 3–16)
BLD GP AB SCN SERPL QL: NORMAL
BUN SERPL-MCNC: 16 MG/DL (ref 7–20)
CALCIUM SERPL-MCNC: 9.6 MG/DL (ref 8.3–10.6)
CHLORIDE SERPL-SCNC: 100 MMOL/L (ref 99–110)
CO2 SERPL-SCNC: 26 MMOL/L (ref 21–32)
CREAT SERPL-MCNC: 0.6 MG/DL (ref 0.6–1.2)
DEPRECATED RDW RBC AUTO: 13.3 % (ref 12.4–15.4)
EKG ATRIAL RATE: 63 BPM
EKG ATRIAL RATE: 79 BPM
EKG DIAGNOSIS: NORMAL
EKG DIAGNOSIS: NORMAL
EKG P AXIS: 50 DEGREES
EKG P-R INTERVAL: 156 MS
EKG P-R INTERVAL: 162 MS
EKG Q-T INTERVAL: 392 MS
EKG Q-T INTERVAL: 426 MS
EKG QRS DURATION: 90 MS
EKG QRS DURATION: 98 MS
EKG QTC CALCULATION (BAZETT): 401 MS
EKG QTC CALCULATION (BAZETT): 488 MS
EKG R AXIS: 127 DEGREES
EKG R AXIS: 31 DEGREES
EKG T AXIS: 15 DEGREES
EKG T AXIS: 157 DEGREES
EKG VENTRICULAR RATE: 63 BPM
EKG VENTRICULAR RATE: 79 BPM
GFR SERPLBLD CREATININE-BSD FMLA CKD-EPI: >60 ML/MIN/{1.73_M2}
GLUCOSE SERPL-MCNC: 108 MG/DL (ref 70–99)
HCT VFR BLD AUTO: 39.9 % (ref 36–48)
HGB BLD-MCNC: 13.3 G/DL (ref 12–16)
MCH RBC QN AUTO: 29.4 PG (ref 26–34)
MCHC RBC AUTO-ENTMCNC: 33.4 G/DL (ref 31–36)
MCV RBC AUTO: 88.1 FL (ref 80–100)
PLATELET # BLD AUTO: 256 K/UL (ref 135–450)
PMV BLD AUTO: 8.6 FL (ref 5–10.5)
POC ACT LR: 259 SEC
POC ACT LR: 270 SEC
POC ACT LR: 301 SEC
POC ACT LR: 303 SEC
POC ACT LR: 353 SEC
POC ACT LR: 364 SEC
POTASSIUM SERPL-SCNC: 3.9 MMOL/L (ref 3.5–5.1)
RBC # BLD AUTO: 4.53 M/UL (ref 4–5.2)
SODIUM SERPL-SCNC: 135 MMOL/L (ref 136–145)
WBC # BLD AUTO: 6.4 K/UL (ref 4–11)

## 2023-09-21 PROCEDURE — 93622 COMP EP EVAL L VENTR PAC&REC: CPT | Performed by: INTERNAL MEDICINE

## 2023-09-21 PROCEDURE — 93010 ELECTROCARDIOGRAM REPORT: CPT | Performed by: INTERNAL MEDICINE

## 2023-09-21 PROCEDURE — 7100000001 HC PACU RECOVERY - ADDTL 15 MIN: Performed by: ANESTHESIOLOGY

## 2023-09-21 PROCEDURE — 7100000011 HC PHASE II RECOVERY - ADDTL 15 MIN: Performed by: ANESTHESIOLOGY

## 2023-09-21 PROCEDURE — 93623 PRGRMD STIMJ&PACG IV RX NFS: CPT | Performed by: INTERNAL MEDICINE

## 2023-09-21 PROCEDURE — C9399 UNCLASSIFIED DRUGS OR BIOLOG: HCPCS

## 2023-09-21 PROCEDURE — 7100000000 HC PACU RECOVERY - FIRST 15 MIN: Performed by: ANESTHESIOLOGY

## 2023-09-21 PROCEDURE — 2580000003 HC RX 258

## 2023-09-21 PROCEDURE — 86901 BLOOD TYPING SEROLOGIC RH(D): CPT

## 2023-09-21 PROCEDURE — 93656 COMPRE EP EVAL ABLTJ ATR FIB: CPT

## 2023-09-21 PROCEDURE — 3700000001 HC ADD 15 MINUTES (ANESTHESIA): Performed by: ANESTHESIOLOGY

## 2023-09-21 PROCEDURE — 85347 COAGULATION TIME ACTIVATED: CPT

## 2023-09-21 PROCEDURE — C1760 CLOSURE DEV, VASC: HCPCS

## 2023-09-21 PROCEDURE — 93312 ECHO TRANSESOPHAGEAL: CPT

## 2023-09-21 PROCEDURE — 36415 COLL VENOUS BLD VENIPUNCTURE: CPT

## 2023-09-21 PROCEDURE — 93319 3D ECHO IMG CGEN CAR ANOMAL: CPT

## 2023-09-21 PROCEDURE — 3700000000 HC ANESTHESIA ATTENDED CARE: Performed by: ANESTHESIOLOGY

## 2023-09-21 PROCEDURE — C1759 CATH, INTRA ECHOCARDIOGRAPHY: HCPCS

## 2023-09-21 PROCEDURE — C1732 CATH, EP, DIAG/ABL, 3D/VECT: HCPCS

## 2023-09-21 PROCEDURE — C1894 INTRO/SHEATH, NON-LASER: HCPCS

## 2023-09-21 PROCEDURE — 6360000002 HC RX W HCPCS: Performed by: NURSE ANESTHETIST, CERTIFIED REGISTERED

## 2023-09-21 PROCEDURE — 86850 RBC ANTIBODY SCREEN: CPT

## 2023-09-21 PROCEDURE — 93657 TX L/R ATRIAL FIB ADDL: CPT | Performed by: INTERNAL MEDICINE

## 2023-09-21 PROCEDURE — 2500000003 HC RX 250 WO HCPCS

## 2023-09-21 PROCEDURE — 93656 COMPRE EP EVAL ABLTJ ATR FIB: CPT | Performed by: INTERNAL MEDICINE

## 2023-09-21 PROCEDURE — C1730 CATH, EP, 19 OR FEW ELECT: HCPCS

## 2023-09-21 PROCEDURE — 93005 ELECTROCARDIOGRAM TRACING: CPT | Performed by: INTERNAL MEDICINE

## 2023-09-21 PROCEDURE — 7100000010 HC PHASE II RECOVERY - FIRST 15 MIN: Performed by: ANESTHESIOLOGY

## 2023-09-21 PROCEDURE — 2500000003 HC RX 250 WO HCPCS: Performed by: NURSE ANESTHETIST, CERTIFIED REGISTERED

## 2023-09-21 PROCEDURE — C1769 GUIDE WIRE: HCPCS

## 2023-09-21 PROCEDURE — C1893 INTRO/SHEATH, FIXED,NON-PEEL: HCPCS

## 2023-09-21 PROCEDURE — 80048 BASIC METABOLIC PNL TOTAL CA: CPT

## 2023-09-21 PROCEDURE — 6360000002 HC RX W HCPCS

## 2023-09-21 PROCEDURE — 85027 COMPLETE CBC AUTOMATED: CPT

## 2023-09-21 PROCEDURE — C9399 UNCLASSIFIED DRUGS OR BIOLOG: HCPCS | Performed by: NURSE ANESTHETIST, CERTIFIED REGISTERED

## 2023-09-21 PROCEDURE — 93622 COMP EP EVAL L VENTR PAC&REC: CPT

## 2023-09-21 PROCEDURE — 2580000003 HC RX 258: Performed by: NURSE ANESTHETIST, CERTIFIED REGISTERED

## 2023-09-21 PROCEDURE — 86900 BLOOD TYPING SEROLOGIC ABO: CPT

## 2023-09-21 PROCEDURE — 93623 PRGRMD STIMJ&PACG IV RX NFS: CPT

## 2023-09-21 RX ORDER — PROPOFOL 10 MG/ML
INJECTION, EMULSION INTRAVENOUS PRN
Status: DISCONTINUED | OUTPATIENT
Start: 2023-09-21 | End: 2023-09-21 | Stop reason: SDUPTHER

## 2023-09-21 RX ORDER — MEPERIDINE HYDROCHLORIDE 25 MG/ML
12.5 INJECTION INTRAMUSCULAR; INTRAVENOUS; SUBCUTANEOUS EVERY 5 MIN PRN
Status: DISCONTINUED | OUTPATIENT
Start: 2023-09-21 | End: 2023-09-21 | Stop reason: HOSPADM

## 2023-09-21 RX ORDER — SODIUM CHLORIDE 9 MG/ML
INJECTION, SOLUTION INTRAVENOUS PRN
Status: DISCONTINUED | OUTPATIENT
Start: 2023-09-21 | End: 2023-09-21 | Stop reason: HOSPADM

## 2023-09-21 RX ORDER — ONDANSETRON 2 MG/ML
INJECTION INTRAMUSCULAR; INTRAVENOUS PRN
Status: DISCONTINUED | OUTPATIENT
Start: 2023-09-21 | End: 2023-09-21 | Stop reason: SDUPTHER

## 2023-09-21 RX ORDER — SODIUM CHLORIDE 9 MG/ML
INJECTION, SOLUTION INTRAVENOUS CONTINUOUS PRN
Status: DISCONTINUED | OUTPATIENT
Start: 2023-09-21 | End: 2023-09-21 | Stop reason: SDUPTHER

## 2023-09-21 RX ORDER — DEXAMETHASONE SODIUM PHOSPHATE 4 MG/ML
INJECTION, SOLUTION INTRA-ARTICULAR; INTRALESIONAL; INTRAMUSCULAR; INTRAVENOUS; SOFT TISSUE PRN
Status: DISCONTINUED | OUTPATIENT
Start: 2023-09-21 | End: 2023-09-21 | Stop reason: SDUPTHER

## 2023-09-21 RX ORDER — SODIUM CHLORIDE 0.9 % (FLUSH) 0.9 %
5-40 SYRINGE (ML) INJECTION PRN
Status: DISCONTINUED | OUTPATIENT
Start: 2023-09-21 | End: 2023-09-21 | Stop reason: HOSPADM

## 2023-09-21 RX ORDER — EPHEDRINE SULFATE/0.9% NACL/PF 50 MG/5 ML
SYRINGE (ML) INTRAVENOUS PRN
Status: DISCONTINUED | OUTPATIENT
Start: 2023-09-21 | End: 2023-09-21 | Stop reason: SDUPTHER

## 2023-09-21 RX ORDER — SUCCINYLCHOLINE/SOD CL,ISO/PF 200MG/10ML
SYRINGE (ML) INTRAVENOUS PRN
Status: DISCONTINUED | OUTPATIENT
Start: 2023-09-21 | End: 2023-09-21 | Stop reason: SDUPTHER

## 2023-09-21 RX ORDER — ONDANSETRON 2 MG/ML
4 INJECTION INTRAMUSCULAR; INTRAVENOUS
Status: DISCONTINUED | OUTPATIENT
Start: 2023-09-21 | End: 2023-09-21 | Stop reason: HOSPADM

## 2023-09-21 RX ORDER — PANTOPRAZOLE SODIUM 40 MG/1
40 TABLET, DELAYED RELEASE ORAL
Qty: 30 TABLET | Refills: 0 | Status: SHIPPED | OUTPATIENT
Start: 2023-09-21

## 2023-09-21 RX ORDER — OXYCODONE HYDROCHLORIDE 5 MG/1
5 TABLET ORAL
Status: DISCONTINUED | OUTPATIENT
Start: 2023-09-21 | End: 2023-09-21 | Stop reason: HOSPADM

## 2023-09-21 RX ORDER — SODIUM CHLORIDE 0.9 % (FLUSH) 0.9 %
5-40 SYRINGE (ML) INJECTION EVERY 12 HOURS SCHEDULED
Status: DISCONTINUED | OUTPATIENT
Start: 2023-09-21 | End: 2023-09-21 | Stop reason: HOSPADM

## 2023-09-21 RX ORDER — ACETAMINOPHEN 325 MG/1
650 TABLET ORAL EVERY 4 HOURS PRN
Status: DISCONTINUED | OUTPATIENT
Start: 2023-09-21 | End: 2023-09-21 | Stop reason: HOSPADM

## 2023-09-21 RX ORDER — ROCURONIUM BROMIDE 10 MG/ML
INJECTION, SOLUTION INTRAVENOUS PRN
Status: DISCONTINUED | OUTPATIENT
Start: 2023-09-21 | End: 2023-09-21 | Stop reason: SDUPTHER

## 2023-09-21 RX ORDER — LIDOCAINE HYDROCHLORIDE 20 MG/ML
INJECTION, SOLUTION INTRAVENOUS PRN
Status: DISCONTINUED | OUTPATIENT
Start: 2023-09-21 | End: 2023-09-21 | Stop reason: SDUPTHER

## 2023-09-21 RX ORDER — LABETALOL HYDROCHLORIDE 5 MG/ML
10 INJECTION, SOLUTION INTRAVENOUS
Status: DISCONTINUED | OUTPATIENT
Start: 2023-09-21 | End: 2023-09-21 | Stop reason: HOSPADM

## 2023-09-21 RX ORDER — HYDRALAZINE HYDROCHLORIDE 20 MG/ML
10 INJECTION INTRAMUSCULAR; INTRAVENOUS
Status: DISCONTINUED | OUTPATIENT
Start: 2023-09-21 | End: 2023-09-21 | Stop reason: HOSPADM

## 2023-09-21 RX ORDER — HEPARIN SODIUM 1000 [USP'U]/ML
INJECTION, SOLUTION INTRAVENOUS; SUBCUTANEOUS PRN
Status: DISCONTINUED | OUTPATIENT
Start: 2023-09-21 | End: 2023-09-21 | Stop reason: SDUPTHER

## 2023-09-21 RX ORDER — PROTAMINE SULFATE 10 MG/ML
INJECTION, SOLUTION INTRAVENOUS PRN
Status: DISCONTINUED | OUTPATIENT
Start: 2023-09-21 | End: 2023-09-21 | Stop reason: SDUPTHER

## 2023-09-21 RX ORDER — GLYCOPYRROLATE 0.2 MG/ML
INJECTION INTRAMUSCULAR; INTRAVENOUS PRN
Status: DISCONTINUED | OUTPATIENT
Start: 2023-09-21 | End: 2023-09-21 | Stop reason: SDUPTHER

## 2023-09-21 RX ORDER — FENTANYL CITRATE 50 UG/ML
INJECTION, SOLUTION INTRAMUSCULAR; INTRAVENOUS PRN
Status: DISCONTINUED | OUTPATIENT
Start: 2023-09-21 | End: 2023-09-21 | Stop reason: SDUPTHER

## 2023-09-21 RX ORDER — ADENOSINE 3 MG/ML
INJECTION, SOLUTION INTRAVENOUS PRN
Status: DISCONTINUED | OUTPATIENT
Start: 2023-09-21 | End: 2023-09-21 | Stop reason: SDUPTHER

## 2023-09-21 RX ORDER — HYDROMORPHONE HYDROCHLORIDE 2 MG/ML
0.5 INJECTION, SOLUTION INTRAMUSCULAR; INTRAVENOUS; SUBCUTANEOUS EVERY 5 MIN PRN
Status: DISCONTINUED | OUTPATIENT
Start: 2023-09-21 | End: 2023-09-21 | Stop reason: HOSPADM

## 2023-09-21 RX ADMIN — Medication 10 MG: at 08:42

## 2023-09-21 RX ADMIN — Medication 20 MG: at 07:45

## 2023-09-21 RX ADMIN — SUGAMMADEX 200 MG: 100 INJECTION, SOLUTION INTRAVENOUS at 08:48

## 2023-09-21 RX ADMIN — LIDOCAINE HYDROCHLORIDE 100 MG: 20 INJECTION, SOLUTION INTRAVENOUS at 07:38

## 2023-09-21 RX ADMIN — ROCURONIUM BROMIDE 5 MG: 10 INJECTION, SOLUTION INTRAVENOUS at 07:30

## 2023-09-21 RX ADMIN — ONDANSETRON 4 MG: 2 INJECTION INTRAMUSCULAR; INTRAVENOUS at 07:40

## 2023-09-21 RX ADMIN — SODIUM CHLORIDE: 9 INJECTION, SOLUTION INTRAVENOUS at 08:21

## 2023-09-21 RX ADMIN — ROCURONIUM BROMIDE 25 MG: 10 INJECTION, SOLUTION INTRAVENOUS at 07:50

## 2023-09-21 RX ADMIN — GLYCOPYRROLATE 0.2 MG: 0.2 INJECTION INTRAMUSCULAR; INTRAVENOUS at 07:57

## 2023-09-21 RX ADMIN — HEPARIN SODIUM 2000 UNITS: 1000 INJECTION INTRAVENOUS; SUBCUTANEOUS at 08:50

## 2023-09-21 RX ADMIN — FENTANYL CITRATE 50 MCG: 50 INJECTION, SOLUTION INTRAMUSCULAR; INTRAVENOUS at 07:38

## 2023-09-21 RX ADMIN — ISOPROTERENOL HYDROCHLORIDE 1 MCG/MIN: 0.2 INJECTION, SOLUTION INTRAMUSCULAR; INTRAVENOUS at 09:57

## 2023-09-21 RX ADMIN — PROTAMINE SULFATE 20 MG: 10 INJECTION, SOLUTION INTRAVENOUS at 10:24

## 2023-09-21 RX ADMIN — DEXAMETHASONE SODIUM PHOSPHATE 4 MG: 4 INJECTION, SOLUTION INTRAMUSCULAR; INTRAVENOUS at 07:40

## 2023-09-21 RX ADMIN — HEPARIN SODIUM 2000 UNITS: 1000 INJECTION INTRAVENOUS; SUBCUTANEOUS at 09:25

## 2023-09-21 RX ADMIN — ADENOSINE 12 MG: 3 INJECTION, SOLUTION INTRAVENOUS at 09:55

## 2023-09-21 RX ADMIN — ADENOSINE 6 MG: 3 INJECTION, SOLUTION INTRAVENOUS at 09:52

## 2023-09-21 RX ADMIN — Medication 10 MG: at 07:56

## 2023-09-21 RX ADMIN — PROPOFOL 150 MG: 10 INJECTION, EMULSION INTRAVENOUS at 07:30

## 2023-09-21 RX ADMIN — Medication 10 MG: at 08:24

## 2023-09-21 RX ADMIN — ADENOSINE 12 MG: 3 INJECTION, SOLUTION INTRAVENOUS at 09:54

## 2023-09-21 RX ADMIN — PHENYLEPHRINE HYDROCHLORIDE 30 MCG/MIN: 10 INJECTION INTRAVENOUS at 07:37

## 2023-09-21 RX ADMIN — SODIUM CHLORIDE: 9 INJECTION, SOLUTION INTRAVENOUS at 07:21

## 2023-09-21 RX ADMIN — HEPARIN SODIUM 5000 UNITS: 1000 INJECTION INTRAVENOUS; SUBCUTANEOUS at 08:34

## 2023-09-21 RX ADMIN — FENTANYL CITRATE 50 MCG: 50 INJECTION, SOLUTION INTRAMUSCULAR; INTRAVENOUS at 07:30

## 2023-09-21 RX ADMIN — HEPARIN SODIUM 1000 UNITS: 1000 INJECTION INTRAVENOUS; SUBCUTANEOUS at 10:02

## 2023-09-21 RX ADMIN — ADENOSINE 6 MG: 3 INJECTION, SOLUTION INTRAVENOUS at 09:53

## 2023-09-21 RX ADMIN — HEPARIN SODIUM 5000 UNITS: 1000 INJECTION INTRAVENOUS; SUBCUTANEOUS at 08:17

## 2023-09-21 RX ADMIN — Medication 160 MG: at 07:30

## 2023-09-21 ASSESSMENT — LIFESTYLE VARIABLES: SMOKING_STATUS: 0

## 2023-09-21 NOTE — PROGRESS NOTES
PRE-PROCEDURE    DATE: 9/21/2023 ARRIVAL TO CATH LAB: 6:56 AM    ADMIT SOURCE: Outpatient    ID & ALLERGY BAND: On    CONSENT: Yes    NPO SINCE: Midnight    LABS/PREGNANCY TEST: N/A    PULSES: Right DP 2+  Right PT 3+  Left DP 3+  Left PT 3+    IV SITE : Started in left arm.  with fluids infusing at kvo 6:56 AM     EKG RHYTHM: Normal Sinus Rhythm

## 2023-09-21 NOTE — ANESTHESIA POSTPROCEDURE EVALUATION
Department of Anesthesiology  Postprocedure Note    Patient: Evelin Velez  MRN: [id number]  YOB: 1960  Date of evaluation: 9/21/2023      Procedure Summary     Date: 09/21/23 Room / Location: [facility] Cath Lab; [facility] Echocardiography    Anesthesia Start: 0721 Anesthesia Stop: 1042    Procedure: ECHO/ABLATION WITH ANESTHESIA Diagnosis: Paroxysmal atrial fibrillation    Scheduled Providers: Rayshawn Jones MD Responsible Provider: Rayshawn Jones MD    Anesthesia Type: general ASA Status: 3          Anesthesia Type: No value filed.     Garry Phase I: Garry Score: 8    Garry Phase II:        Anesthesia Post Evaluation    Patient location during evaluation: PACU  Patient participation: complete - patient participated  Level of consciousness: awake and alert  Airway patency: patent  Nausea & Vomiting: no vomiting and no nausea  Complications: no  Cardiovascular status: hemodynamically stable  Respiratory status: acceptable  Hydration status: stable  Pain management: adequate

## 2023-09-21 NOTE — DISCHARGE INSTRUCTIONS
CARDIAC ABLATION    Care of your puncture site:  Remove bandage 24 hours after the procedure. May shower in 24 hours but do not sit in a bathtub/pool of water for 5 days or until the wound is healed. Inspect the site daily and gently clean using soap and water while standing in the shower. Dry thoroughly and apply a Band-Aid that covers the entire site. Do not apply powder or lotion. Normal Observations:  Soreness or tenderness which may last one week. Mild oozing from the incision site. Possible bruising that could last 2 weeks. Activity:  You may resume driving 24 hours following the procedure. You may resume normal activity in 3 days or after the wound heals. Avoid lifting more than 10 pounds for 3 days or until the wound heals. Avoid strenuous exercise or activity for 1 week    Nutrition:  Regular diet       Call your doctor immediately if your condition worsens, for any other concerns, for a follow-up appointment or if you experience any of the following:  Significant bleeding that does not stop after 10 minutes of applying firm pressure on the puncture site. Increased swelling on the groin or leg. Unusual pain, numbness, or tingling of the groin or down the leg. Any signs of infection such as: redness, yellow drainage at the site, swelling or pain. SEDATION DISCHARGE INSTRUCTIONS    9/21/2023     Wear your seatbelt home. You are under the influence of drugs. Do not drink alcohol, drive, operate machinery, or make any important decisions or sign any legal documents for 24 hours  A responsible adult needs to be with you for 24 hours. You may experience lightheadedness, dizziness, nausea, and sleepiness following surgery. Rest at home today- increase activity as tolerated. Progress slowly to a regular diet and drink plenty of fluids unless your physician has instructed you otherwise. If nausea becomes a problem, call your physician.   Coughing, sore throat, and muscle aches are other

## 2023-09-21 NOTE — PROCEDURES
using our CS catheter and ablation catheter to assess the cardiac conduction system and to attempt to induce atrial tachydysrhythmia. The ablation catheter were moved from the left atrium to the left ventricular apex and His bundle position. His bundle potentials was recorded and pacing and recordings were performed from right atrium, coronary sinus and LV apex with the following results:     Sinus cycle length was 950 msec  CT interval was 160 msec  QRS duration was 95 msec  QT interval was 435 msec  AH interval was 69 msec  HV interval was 52 msec  Pacing from left atrium, 1:1 conduction over AV node with (AV wenckebach) was 310  msec  Pacing from left atrium, AV jesse ERP was <600/250 msec where AERP occurred. Pacing from LV apex, 1:1 retrograde conduction over AV node (VA wenckebach) was 370 msec    Curiously while pacing the mid CS earlier in the case, the Ventricle was preexcited concerning for a pathway. However, parahisian pacing and adenosine administration did not show any pathway. This makes it likely that the CS was in an LV branch. After ablation was complete, catheters were placed in the left and right atrium, His-position, right ventricle, and left ventricle for pacing and recording. Isuprel titrated to at least 25% increase in HR was started. Arrhythmia was attempted to be induced by rapid atrial and ventricular pacing, and there was no induction of atrial tachydysrhythmia. Then both the ablation and CS catheters were removed from the body. ICE was then used to check if there was new or change in pericardial effusion. None was noted. ICE catheter was then removed. All the 3 sheaths were removed from the right femoral vein. Since patient was on anticoagulation with heparin with high ACT, we used perclose device for closure of access sites on the right femoral vein. 30 mg of protamine was given to partially reverse the ACT.      The patient tolerated the procedure well and there were no

## 2023-09-21 NOTE — PROGRESS NOTES
Patient up out of bed, walked around PACU, right groin dressing unchanged, soft, denies pain. Will prepare for discharge home.

## 2023-09-21 NOTE — H&P
Cardiac Electrophysiology Consultation   Date: 9/21/2023     Chief Complaint:   No chief complaint on file. HPI: Irma Garcia is a 66 y.o. patient with a history of syncope.     07/30/2023 Patient presented to [facility] following a syncopal episode. This was preceded by palpitations the night prior leading all the way until she was in the shower and felt it coming. Her  helped her to the ground. On arrival, patient was afebrile and nontoxic in appearance. ECG revealed NSR without ischemia. CTPA which shows no PE or other pulmonary abnormality. Orthostatic vitals were negative. Echo and 30 day monitor ordered. 08/03/23 Urgent report received from current event monitor . Revealed atrial fibrillation with rapid ventricular rate at 195 bpm with similar symptoms of lightheadedness. Started on Toprol XL 25 mg QD and Eliquis 5 mg BID.     08/15/23 Another urgent report received. Revealed atrial fibrillation with rate of 192 bpm. Patient had complaints of racing heart and diaphoresis due to heat and humidity. Interval History: Today, patient presents to the office today for follow up. Reports she was at the grocery store on Monday 8/14/23 when she began to experience racing heart, palpitations, shortness of breath, and diaphoresis. Urgent report form the event monitor shows, atrial fibrillation with RVR at 195 bpm. Admits to experiencing episodes in the past but unsure of what they were at that time. No chest pain, presyncope or syncope. No other clinical signs or symptoms of heart failure. Tolerating medications well. Past Medical History:   Diagnosis Date    Hypertension       No past surgical history on file. Allergies:  No Known Allergies    Medication:   Prior to Admission medications    Medication Sig Start Date End Date Taking?  Authorizing Provider   metoprolol succinate (TOPROL XL) 25 MG extended release tablet Take 1 tablet by mouth daily 8/30/23   Savannah Muniz MD

## 2023-09-21 NOTE — PROGRESS NOTES
Patient transferred from cath lab to pacu, responds to voice, VSS, right groin soft, dressing CDI, RLE vascular checks WDL.

## 2023-09-21 NOTE — PROGRESS NOTES
Discharge instructions reviewed with patient and . All home medications have been reviewed, questions answered, and patient verbalized understanding. Discharge instructions signed and copies given. Patient discharged home with belongings.

## 2023-09-21 NOTE — PROGRESS NOTES
Patient awake, VSS, right groin unchanged, denies pain, phase I discharge criteria met,  at the bedside.

## 2023-09-22 LAB — POC ACT LR: 331 SEC

## 2023-10-17 ENCOUNTER — TELEPHONE (OUTPATIENT)
Dept: CARDIOLOGY CLINIC | Age: 63
End: 2023-10-17

## 2023-10-17 NOTE — TELEPHONE ENCOUNTER
Rubi Carrillo, called in today the insurance is fighting the claim from when she wore her heart monitor.   She stated that she can give you more information if you give her a call     Rubi Carrillo can be reached at [phone]

## 2023-10-23 NOTE — TELEPHONE ENCOUNTER
Please reach out to patient and obtain information sent by her insurance. If she is able to fax any correspondence from the insurance company to our office at [phone] this would be helpful.

## 2023-10-23 NOTE — TELEPHONE ENCOUNTER
Enoc Barger called in this morning wanting to talk to Dr. Gio Adams RN regarding her insurance claim.      You can reach Enoc Barger at #b

## 2023-10-24 NOTE — TELEPHONE ENCOUNTER
Called pt in regards to message below and she gave me insurance number and fax. She states they want us to send office notes and and test that can prove why she needed to wear a monitor. Will call to see what all they need from us.      Insurance number: [phone]    Fax: [phone]

## 2023-10-25 ENCOUNTER — OFFICE VISIT (OUTPATIENT)
Dept: CARDIOLOGY CLINIC | Age: 63
End: 2023-10-25
Payer: COMMERCIAL

## 2023-10-25 VITALS
BODY MASS INDEX: 41.99 KG/M2 | WEIGHT: 237 LBS | OXYGEN SATURATION: 98 % | SYSTOLIC BLOOD PRESSURE: 118 MMHG | HEIGHT: 63 IN | DIASTOLIC BLOOD PRESSURE: 64 MMHG | HEART RATE: 74 BPM

## 2023-10-25 DIAGNOSIS — I10 ESSENTIAL HYPERTENSION: ICD-10-CM

## 2023-10-25 DIAGNOSIS — I48.0 PAROXYSMAL ATRIAL FIBRILLATION (HCC): Primary | ICD-10-CM

## 2023-10-25 DIAGNOSIS — Z79.01 CURRENT USE OF ANTICOAGULANT THERAPY: ICD-10-CM

## 2023-10-25 DIAGNOSIS — R92.8 ABNORMAL FINDING ON MAMMOGRAPHY: ICD-10-CM

## 2023-10-25 DIAGNOSIS — R55 SYNCOPE AND COLLAPSE: ICD-10-CM

## 2023-10-25 PROCEDURE — 3078F DIAST BP <80 MM HG: CPT | Performed by: INTERNAL MEDICINE

## 2023-10-25 PROCEDURE — 99214 OFFICE O/P EST MOD 30 MIN: CPT | Performed by: INTERNAL MEDICINE

## 2023-10-25 PROCEDURE — 3074F SYST BP LT 130 MM HG: CPT | Performed by: INTERNAL MEDICINE

## 2023-10-25 PROCEDURE — 93000 ELECTROCARDIOGRAM COMPLETE: CPT | Performed by: INTERNAL MEDICINE

## 2023-11-08 ENCOUNTER — TELEPHONE (OUTPATIENT)
Dept: CARDIOLOGY CLINIC | Age: 63
End: 2023-11-08

## 2023-11-08 NOTE — TELEPHONE ENCOUNTER
Bubba Carolina is calling stating she had an ablation done 9/21/2023 and was given pantopriazole 40 mg once a day for 30 days. She is now complaining of nausea after she eats and wants to know if she could a refill on this medication.     Has an appt to see Deidre Ko for a 3 mth f/u on 12/18/23    She uses the pharmacy:  [address] [facility], [address] [facility] Jass Presume [phone]  [address] [address]  Phone: [phone]  Fax: [phone]

## 2023-11-09 RX ORDER — PANTOPRAZOLE SODIUM 40 MG/1
40 TABLET, DELAYED RELEASE ORAL
Qty: 30 TABLET | Refills: 0 | Status: SHIPPED | OUTPATIENT
Start: 2023-11-09

## 2023-11-09 NOTE — TELEPHONE ENCOUNTER
Called patient no answer left message advising that I sent refill for the Protonix to [facility] and to follow up with PCP for nausea.

## 2023-11-21 ENCOUNTER — HOSPITAL ENCOUNTER (OUTPATIENT)
Dept: SLEEP CENTER | Age: 63
Discharge: HOME | End: 2023-11-21
Payer: COMMERCIAL

## 2023-11-21 DIAGNOSIS — G47.33 OSA (OBSTRUCTIVE SLEEP APNEA): ICD-10-CM

## 2023-11-21 PROCEDURE — 95810 POLYSOM 6/> YRS 4/> PARAM: CPT

## 2023-11-25 PROBLEM — G47.33 OSA (OBSTRUCTIVE SLEEP APNEA): Status: ACTIVE | Noted: 2023-11-25

## 2023-11-28 ENCOUNTER — TELEPHONE (OUTPATIENT)
Dept: PULMONOLOGY | Age: 63
End: 2023-11-28

## 2023-11-28 NOTE — TELEPHONE ENCOUNTER
PSG Sleep study showed moderate YUE (on a scale of mild, moderate and severe). AHI was 15.9  per hr. (Average times per hr breathing was obstructed). O2 Desaturations to 76% (lowest o2)   Dr Yamileth Stallings: Follow up with the patient's sleep physician to discuss results  A trial of CPAP titration is recommended with supplemental oxygen per protocol if needed. Avoid sedatives, alcohol and caffeinated drinks at bedtime. Avoid driving while sleepy.        LMOM to call

## 2023-11-29 NOTE — TELEPHONE ENCOUNTER
Pt notified of message  States she will not be able to do til the first of the year   She took number to call

## 2023-12-28 ENCOUNTER — HOSPITAL ENCOUNTER (OUTPATIENT)
Dept: SLEEP CENTER | Age: 63
Discharge: HOME | End: 2023-12-28
Payer: COMMERCIAL

## 2023-12-28 DIAGNOSIS — G47.33 OSA (OBSTRUCTIVE SLEEP APNEA): ICD-10-CM

## 2023-12-28 PROCEDURE — 95811 POLYSOM 6/>YRS CPAP 4/> PARM: CPT

## 2024-01-02 NOTE — PROGRESS NOTES
Irma Wheatley         : 1960  [] [facility]     [] [facility]      [] [facility]     [][facility]    [] [facility]  [] [facility]  [] [facility]   [] [facility] [] Other:  Diagnosis: [x] YUE (G47.33) [] CSA (G47.31) [] Apnea (G47.30)   Length of Need: [] 12 Months [x] 99 Months [] Other:    Machine (JOSÉ MIGUEL!):  [x] ResMed AirSense     Auto [] Other:     [x]  CPAP () [] Bilevel ()   Mode: [x] Auto [] Spontaneous    Mode: [] Auto [] Spontaneous             9 cm                 Comfort Settings:     If the order for CPAP  - Ramp Pressure: 5 cmH2O                                        - Ramp time: auto min                                     -  Flex/EPR - 3 full time       Humidifier: [x] Heated ()        [x] Water chamber replacement ()/ 1 per 6 months        Mask:  Please always start with the mask the patient used during the titraion   [x] Nasal () /1 per 3 months    [x] Patient choice -Size and fit mask    [] Dispense: small Airfit P10 nasal pillows     [x] Headgear () / 1 per 6 months        [x] Replacement Nasal Pillows ()/2 per month         Tubing: [x] Heated ()/1 per 3 months    [] Standard ()/1 per 3 months [] Other:           Filters: [x] Non-disposable ()/1 per 6 months     [x] Ultra-Fine, Disposable ()/2 per month        Miscellaneous: [x] Chin Strap ()/ 1 per 6 months [] O2 bleed-in:       LPM   [] Oximetry on CPAP/Bilevel []  Other:          Start Order Date: 24    MEDICAL JUSTIFICATION:  I, the undersigned, certify that the above prescribed supplies are medically necessary for this patient’s wellbeing.  In my opinion, the supplies are both reasonable and necessary in reference to accepted standards of medicalpractice in treatment of this patient’s condition.    Jorge Flores MD      NPI: [id number]       Order Signed Date: 24    Electronically signed by Jorge Flores MD on 2024 at 8:05 AM

## 2024-01-04 ENCOUNTER — TELEPHONE (OUTPATIENT)
Dept: PULMONOLOGY | Age: 64
End: 2024-01-04

## 2024-01-04 NOTE — TELEPHONE ENCOUNTER
Titration study shows adequate control of the events at a CPAP pressure of 9 cm.      NEED DME:     Patient will need appointment 31-90 days after starting new machine    LMOM to call

## 2024-01-09 RX ORDER — FLECAINIDE ACETATE 50 MG/1
50 TABLET ORAL 2 TIMES DAILY
Qty: 180 TABLET | Refills: 3 | Status: SHIPPED | OUTPATIENT
Start: 2024-01-09

## 2024-01-09 NOTE — TELEPHONE ENCOUNTER
Medication Refill    Medication needing refilled:  flecainide (TAMBOCOR)      Dosage of the medication:  50 MG tablet     How are you taking this medication (QD, BID, TID, QID, PRN):  Take 1 tablet by mouth 2 times daily     30 or 90 day supply called in:  90 day supply    When will you run out of your medication:  New pharmacy     Which Pharmacy are we sending the medication to?:  [facility] - [address] - P [phone] - F [phone] (Pharmacy)

## 2024-01-09 NOTE — TELEPHONE ENCOUNTER
Last OV: 10/25/2023  Next OV: 01/16/2024  Most recent Labs: 07/31/2023 renal   Last EKG (if needed): 10/25/2023

## 2024-01-11 ENCOUNTER — TELEPHONE (OUTPATIENT)
Dept: PULMONOLOGY | Age: 64
End: 2024-01-11

## 2024-01-11 NOTE — TELEPHONE ENCOUNTER
Patient calling with [employer] [facility] for Cpap please fax order. Foolow up with patient about process if the equipment comes directly to patient or how the next step works.

## 2024-01-12 ENCOUNTER — TELEPHONE (OUTPATIENT)
Dept: PULMONOLOGY | Age: 64
End: 2024-01-12

## 2024-01-12 NOTE — TELEPHONE ENCOUNTER
Sandra with [employer] called requesting demographics, insurance info, and OV notes prior to the sleep study. Please fax ATTN Sandra [phone]

## 2024-01-15 PROBLEM — I48.0 PAROXYSMAL ATRIAL FIBRILLATION (HCC): Status: ACTIVE | Noted: 2024-01-15

## 2024-01-15 NOTE — PROGRESS NOTES
HCT 39.9 09/21/2023 06:56 AM    HCT 40.6 07/31/2023 05:51 AM    HCT 45.3 07/30/2023 10:29 AM    MCV 88.1 09/21/2023 06:56 AM    MCV 88.2 07/31/2023 05:51 AM    MCV 88.4 07/30/2023 10:29 AM    RDW 13.3 09/21/2023 06:56 AM    RDW 13.2 07/31/2023 05:51 AM    RDW 13.2 07/30/2023 10:29 AM     09/21/2023 06:56 AM     07/31/2023 05:51 AM     07/30/2023 10:29 AM     BMP:  Lab Results   Component Value Date/Time     09/21/2023 06:56 AM     07/31/2023 05:51 AM     07/30/2023 10:29 AM    K 3.9 09/21/2023 06:56 AM    K 4.7 07/31/2023 05:51 AM    K 4.2 07/30/2023 10:29 AM     09/21/2023 06:56 AM     07/31/2023 05:51 AM     07/30/2023 10:29 AM    CO2 26 09/21/2023 06:56 AM    CO2 29 07/31/2023 05:51 AM    CO2 24 07/30/2023 10:29 AM    PHOS 3.5 07/31/2023 05:51 AM    BUN 16 09/21/2023 06:56 AM    BUN 21 07/31/2023 05:51 AM    BUN 18 07/30/2023 10:29 AM    CREATININE 0.6 09/21/2023 06:56 AM    CREATININE 0.7 07/31/2023 05:51 AM    CREATININE 0.7 07/30/2023 10:29 AM     BNP:   Lab Results   Component Value Date/Time    PROBNP 889 07/30/2023 10:29 AM       Parameters:   > 450 pg/mL under age 50  > 900 pg/mL ages 50-75  > 1800 pg/mL over age 75    Iron Studies:  No results found for: \"TIBC\", \"FERRITIN\"  GLUCOSE: No results for input(s): \"GLUCOSE\" in the last 72 hours.  LIVER PROFILE:   Lab Results   Component Value Date/Time    AST 16 07/30/2023 10:29 AM    ALT 10 07/30/2023 10:29 AM    LABALBU 3.4 07/31/2023 05:51 AM    BILITOT 0.6 07/30/2023 10:29 AM    ALKPHOS 94 07/30/2023 10:29 AM     PT/INR: No results found for: \"PROTIME\", \"INR\"  Cardiac Enzymes:No results found for: \"CKTOTAL\", \"CKMB\", \"CKMBINDEX\", \"TROPONINI\"  FASTING LIPID PANEL:No results found for: \"CHOL\", \"HDL\", \"LDLCALC\", \"TRIG\"  TSH: No results found for: \"TSH\"    Cardiac Imaging: Reports interpreted by me and reviewed with patient today.     Procedure performed:  Electrophysiology study with radiofrequency

## 2024-01-16 ENCOUNTER — OFFICE VISIT (OUTPATIENT)
Dept: CARDIOLOGY CLINIC | Age: 64
End: 2024-01-16
Payer: COMMERCIAL

## 2024-01-16 ENCOUNTER — TELEPHONE (OUTPATIENT)
Dept: CARDIOLOGY CLINIC | Age: 64
End: 2024-01-16

## 2024-01-16 VITALS
HEART RATE: 71 BPM | BODY MASS INDEX: 41.99 KG/M2 | WEIGHT: 237 LBS | DIASTOLIC BLOOD PRESSURE: 74 MMHG | OXYGEN SATURATION: 94 % | SYSTOLIC BLOOD PRESSURE: 120 MMHG | HEIGHT: 63 IN

## 2024-01-16 DIAGNOSIS — I48.0 PAROXYSMAL ATRIAL FIBRILLATION (HCC): ICD-10-CM

## 2024-01-16 PROCEDURE — 99214 OFFICE O/P EST MOD 30 MIN: CPT | Performed by: NURSE PRACTITIONER

## 2024-01-16 PROCEDURE — 3078F DIAST BP <80 MM HG: CPT | Performed by: NURSE PRACTITIONER

## 2024-01-16 PROCEDURE — 3074F SYST BP LT 130 MM HG: CPT | Performed by: NURSE PRACTITIONER

## 2024-01-16 PROCEDURE — 93000 ELECTROCARDIOGRAM COMPLETE: CPT | Performed by: NURSE PRACTITIONER

## 2024-01-16 NOTE — TELEPHONE ENCOUNTER
Monitor placed by MARIA A BOO  Monitor company [employer]  Length of monitor 30  Monitor ordered by MICA-CNP  Serial number [id number]  Kit ID   Activation successful prior to pt leaving office? Yes

## 2024-02-05 ENCOUNTER — TELEPHONE (OUTPATIENT)
Dept: PULMONOLOGY | Age: 64
End: 2024-02-05

## 2024-02-05 RX ORDER — METOPROLOL SUCCINATE 25 MG/1
25 TABLET, EXTENDED RELEASE ORAL DAILY
Qty: 30 TABLET | Refills: 5 | Status: SHIPPED | OUTPATIENT
Start: 2024-02-05

## 2024-02-05 NOTE — TELEPHONE ENCOUNTER
[facility] sent a fax pt needs to be seen for PAP follow up between 3/4/24 and 5/2/24

## 2024-02-26 DIAGNOSIS — I48.0 PAROXYSMAL ATRIAL FIBRILLATION (HCC): Primary | ICD-10-CM

## 2024-02-26 PROCEDURE — 93228 REMOTE 30 DAY ECG REV/REPORT: CPT | Performed by: NURSE PRACTITIONER

## 2024-03-21 ENCOUNTER — OFFICE VISIT (OUTPATIENT)
Dept: SLEEP MEDICINE | Age: 64
End: 2024-03-21
Payer: COMMERCIAL

## 2024-03-21 VITALS
HEART RATE: 78 BPM | RESPIRATION RATE: 16 BRPM | HEIGHT: 63 IN | OXYGEN SATURATION: 97 % | TEMPERATURE: 98.5 F | WEIGHT: 236.6 LBS | SYSTOLIC BLOOD PRESSURE: 110 MMHG | BODY MASS INDEX: 41.92 KG/M2 | DIASTOLIC BLOOD PRESSURE: 70 MMHG

## 2024-03-21 DIAGNOSIS — Z99.89 DEPENDENCE ON OTHER ENABLING MACHINES AND DEVICES: ICD-10-CM

## 2024-03-21 DIAGNOSIS — G47.33 OSA ON CPAP: Primary | ICD-10-CM

## 2024-03-21 DIAGNOSIS — I10 ESSENTIAL HYPERTENSION: ICD-10-CM

## 2024-03-21 DIAGNOSIS — I48.0 PAROXYSMAL ATRIAL FIBRILLATION (HCC): ICD-10-CM

## 2024-03-21 DIAGNOSIS — E66.01 CLASS 3 SEVERE OBESITY DUE TO EXCESS CALORIES WITH SERIOUS COMORBIDITY AND BODY MASS INDEX (BMI) OF 40.0 TO 44.9 IN ADULT (HCC): ICD-10-CM

## 2024-03-21 PROCEDURE — 3078F DIAST BP <80 MM HG: CPT | Performed by: PSYCHIATRY & NEUROLOGY

## 2024-03-21 PROCEDURE — 99214 OFFICE O/P EST MOD 30 MIN: CPT | Performed by: PSYCHIATRY & NEUROLOGY

## 2024-03-21 PROCEDURE — 3074F SYST BP LT 130 MM HG: CPT | Performed by: PSYCHIATRY & NEUROLOGY

## 2024-03-21 ASSESSMENT — ENCOUNTER SYMPTOMS
APNEA: 0
CHOKING: 0

## 2024-03-21 NOTE — PROGRESS NOTES
MD ELIECER Flores Board Certified in Sleep Medicine  Certified in Behavioral Sleep Medicine  Board Certified in Neurology [facility]  [address]   [address]  [place]  [address]  [phone]   [facility]  [address]  [address]  [place], [place] [address]                      [facility]  [address] [address] [phone]    Subjective:     Patient ID: Irma Wheatley is a 63 y.o. female.    Chief Complaint   Patient presents with    Sleep Apnea       HPI:        Irma Wheatley is a 63 y.o. female was seen today as a follow for obstructive sleep apnea. The patient underwent comprehensive polysomnogram on 11/21/2023, the overnight registration revealed moderate obstructive sleep apnea with apnea hypopnea index of 15.9 with lowest O2 saturation of 76%, patient spent about 16.8 minutes below 90% (weight was 238 pounds). Subsequently, the patient underwent successful PAP titration on 12/28/2023, the lowest O2 saturation while on PAP was 94%.  Patient is using the PAP machine about 100% of the time, more than 4 hours a nightabout  100 %, in total average of 6:08 hours a night in last 40 days.  Currently on PAP at 9 cm (), the AHI is only 0.3 events per hour at this pressure.  Patient improved regarding daytime sleepiness and fatigue, wakes up refreshed in the morning.  The Patient scored   on [place] Sleepiness Scale ( more than 10 is indicative of daytime sleepiness)   Patient has no problem with PAP pressure or mask.    BP, and A-Fib are stable. Has not gained weight pounds since last visit. No A-Fib after the ablation 2 months ago.   DOT/CDL - N/A      Previous Report(s)Reviewed: historical medical records         Social History     Socioeconomic History    Marital status:      Spouse name: Not on file    Number of children: Not on file

## 2024-03-25 NOTE — TELEPHONE ENCOUNTER
Last OV: 1/16/24  Next OV:  5/1/24  Last refill: 8/4/23  #60  R/F 11   Most recent Labs: 9/21/23  Last EKG (if needed): 1/16/24

## 2024-03-25 NOTE — TELEPHONE ENCOUNTER
Medication Refill    Medication needing refilled:  apixaban (ELIQUIS)     Dosage of the medication:  5 MG TABS tablet     How are you taking this medication (QD, BID, TID, QID, PRN):   Take 1 tablet by mouth 2 times daily     30 or 90 day supply called in:  30 day supply    When will you run out of your medication:    Which Pharmacy are we sending the medication to?:  [id number] - [place], [place] - [address] - P [phone] - F [phone]

## 2024-04-16 NOTE — PROGRESS NOTES
Cardiac Electrophysiology Consultation   Date: 5/1/2024   Reason for Consultation: Syncope/ atrial fibrillation  Consult Requesting Physician: Lyndsey Mendiola MD     Chief Complaint   Patient presents with    Follow-up     PT reports no new cardiac issues at this time.     HPI: Irma Wheatley is a 63 y.o. patient with a history of syncope.     07/30/2023 Patient presented to [facility] following a syncopal episode. This was preceded by palpitations the night prior leading all the way until she was in the shower and felt it coming. Her  helped her to the ground. On arrival, patient was afebrile and nontoxic in appearance. ECG revealed NSR without ischemia. CTPA which shows no PE or other pulmonary abnormality. Orthostatic vitals were negative. Echo and 30 day monitor ordered.     08/03/23 Urgent report received from current event monitor . Revealed atrial fibrillation with rapid ventricular rate at 195 bpm with similar symptoms of lightheadedness. Started on Toprol XL 25 mg QD and Eliquis 5 mg BID.     08/15/23 Another urgent report received. Revealed atrial fibrillation with rate of 192 bpm. Patient had complaints of racing heart and diaphoresis due to heat and humidity.     Seen in office for follow up 08/16/23, had complaints of racing heart, palpitations, shortness of breath, and diaphoresis while at the grocery store. Report revealed atrial fibrillation with rapid rate. Started on Flecainide 50 mg BID and Metoprolol decreased to 12.5 mg daily with plan to proceed with ablation.     09/21/23 Underwent EPS with RFA of AF and PVI. Additional ablation of CFAE     She was seen in office for follow up post ablation and reported that she felt an occasional \"weird feeling\" in her chest. (Consistent with PAC). EKG showed SR with PAC.    She was seen in office with Rachna TERESA on 01/16/2024 for follow up post ablation. Her flecainide was stopped and 30 day monitor was placed to assess rhythm

## 2024-05-01 ENCOUNTER — OFFICE VISIT (OUTPATIENT)
Dept: CARDIOLOGY CLINIC | Age: 64
End: 2024-05-01

## 2024-05-01 VITALS
DIASTOLIC BLOOD PRESSURE: 74 MMHG | SYSTOLIC BLOOD PRESSURE: 112 MMHG | HEIGHT: 63 IN | HEART RATE: 61 BPM | WEIGHT: 238 LBS | BODY MASS INDEX: 42.17 KG/M2 | OXYGEN SATURATION: 98 %

## 2024-05-01 DIAGNOSIS — I48.0 PAROXYSMAL ATRIAL FIBRILLATION (HCC): Primary | ICD-10-CM

## 2024-05-01 DIAGNOSIS — Z79.01 CURRENT USE OF ANTICOAGULANT THERAPY: ICD-10-CM

## 2024-05-01 DIAGNOSIS — R55 SYNCOPE AND COLLAPSE: ICD-10-CM

## 2024-05-01 DIAGNOSIS — I10 ESSENTIAL HYPERTENSION: ICD-10-CM

## 2024-05-01 RX ORDER — METOPROLOL SUCCINATE 25 MG/1
25 TABLET, EXTENDED RELEASE ORAL DAILY
Qty: 90 TABLET | Refills: 3 | Status: SHIPPED | OUTPATIENT
Start: 2024-05-01

## 2024-05-09 ENCOUNTER — HOSPITAL ENCOUNTER (OUTPATIENT)
Dept: WOMENS IMAGING | Age: 64
Discharge: HOME | End: 2024-05-09
Payer: COMMERCIAL

## 2024-05-09 DIAGNOSIS — Z12.31 SCREENING MAMMOGRAM FOR BREAST CANCER: ICD-10-CM

## 2024-05-09 PROCEDURE — G0279 TOMOSYNTHESIS, MAMMO: HCPCS

## 2024-11-21 ENCOUNTER — HOSPITAL ENCOUNTER (OUTPATIENT)
Dept: ULTRASOUND IMAGING | Age: 64
Discharge: HOME | End: 2024-11-21
Payer: COMMERCIAL

## 2024-11-21 ENCOUNTER — HOSPITAL ENCOUNTER (OUTPATIENT)
Dept: WOMENS IMAGING | Age: 64
Discharge: HOME | End: 2024-11-21
Payer: COMMERCIAL

## 2024-11-21 VITALS — HEIGHT: 63 IN | BODY MASS INDEX: 42.17 KG/M2 | WEIGHT: 238 LBS

## 2024-11-21 DIAGNOSIS — Z12.31 VISIT FOR SCREENING MAMMOGRAM: ICD-10-CM

## 2024-11-21 DIAGNOSIS — Z09 FOLLOW-UP EXAM: ICD-10-CM

## 2024-11-21 PROCEDURE — 77067 SCR MAMMO BI INCL CAD: CPT

## 2024-11-21 PROCEDURE — G0279 TOMOSYNTHESIS, MAMMO: HCPCS

## 2024-11-21 PROCEDURE — 76642 ULTRASOUND BREAST LIMITED: CPT

## 2024-12-17 NOTE — PROGRESS NOTES
[facility] PRE-OPERATIVE INSTRUCTIONS    Day of Procedure:  1/2/2024              Arrival time:    0600              Surgery time: 0730    Take the following medications with a sip of water:  Follow your MD/Surgeons pre-procedure instructions regarding your medications     Do not eat or drink anything after 12:00 midnight prior to your surgery.  This includes water, chewing gum, mints and ice chips.   You may brush your teeth and gargle the morning of your surgery, but do not swallow the water.     Please see your family doctor/pediatrician for a history and physical and/or concerning medications.   Bring any test results/reports from your physicians office.   If you are under the care of a heart doctor or specialist doctor, please be aware that you may be asked to see them for clearance.    You may be asked to stop blood thinners such as Coumadin, Plavix, Fragmin, Lovenox, etc., or any anti-inflammatories such as:  Aspirin, Ibuprofen, Advil, Naproxen prior to your surgery.    We also ask that you stop any over the counter medications such as fish oil, vitamin E, glucosamine, garlic, Multivitamins, COQ 10, etc.    We ask that you do not smoke 24 hours prior to surgery.  We ask that you do not  drink any alcoholic beverages 24 hours prior to surgery     You must make arrangements for a responsible adult to take you home after your surgery.    For your safety, you will not be allowed to leave alone or drive yourself home.  Your surgery will be cancelled if you do not have a ride home.     Also for your safety, you must have someone stay with you the first 24 hours after your surgery.     A parent or legal guardian must accompany a child scheduled for surgery and plan to stay at the hospital until the child is discharged.    Please do not bring other children with you.    For your comfort, please wear simple loose fitting clothing to the hospital.  Please do not bring valuables.    Do not wear any make-up

## 2024-12-18 ENCOUNTER — TELEPHONE (OUTPATIENT)
Dept: CARDIOLOGY CLINIC | Age: 64
End: 2024-12-18

## 2024-12-18 NOTE — TELEPHONE ENCOUNTER
CARDIAC CLEARANCE     What type of procedure are you having?  LEFT BREAST LUMPECTOMY WITH INTRAOPERATIVE SONOSITE; LEFT SENTINAL NODE BIOPSY (BLUE DYE ONLY)     Which physician is performing your procedure?  Dr. Butler    When is your procedure scheduled?  01/02/25    [place] you [place]    Are you taking Blood Thinners?  If so what? (Name/dose/frequesncy)  apixaban (ELIQUIS) 5 MG TABS tablet     Does the surgeon want you to stop your blood thinner?  If so for how long?  3 days before, restart 24 hours after    Are you having any new or worsening cardiac symptoms?   N/A    Phone Number and Contact Name for Physicians office:  [phone]    Fax number to send information:  [phone]

## 2024-12-19 NOTE — TELEPHONE ENCOUNTER
LOV: 05/01/2024 with PAULA     On recall list to follow up in 1 year     Patient has a [id number]-FNEu Score of 2 (gender,htn). On Eliquis 5 mg BID     Underwent EPS with RFA of AF and PVI. Ablation of CFAE 09/21/23     Hx of paroxsymal atrial fibrillation.

## 2024-12-31 ENCOUNTER — ANESTHESIA EVENT (OUTPATIENT)
Dept: OPERATING ROOM | Age: 64
End: 2024-12-31
Payer: COMMERCIAL

## 2025-01-02 ENCOUNTER — HOSPITAL ENCOUNTER (OUTPATIENT)
Age: 65
Setting detail: OUTPATIENT SURGERY
Discharge: HOME | End: 2025-01-02
Attending: SURGERY | Admitting: SURGERY
Payer: COMMERCIAL

## 2025-01-02 ENCOUNTER — PREP FOR PROCEDURE (OUTPATIENT)
Dept: SURGERY | Age: 65
End: 2025-01-02

## 2025-01-02 ENCOUNTER — APPOINTMENT (OUTPATIENT)
Dept: WOMENS IMAGING | Age: 65
End: 2025-01-02
Attending: SURGERY
Payer: COMMERCIAL

## 2025-01-02 ENCOUNTER — ANESTHESIA (OUTPATIENT)
Dept: OPERATING ROOM | Age: 65
End: 2025-01-02
Payer: COMMERCIAL

## 2025-01-02 VITALS
BODY MASS INDEX: 42.05 KG/M2 | OXYGEN SATURATION: 100 % | TEMPERATURE: 96.9 F | HEIGHT: 63 IN | RESPIRATION RATE: 16 BRPM | HEART RATE: 67 BPM | DIASTOLIC BLOOD PRESSURE: 72 MMHG | SYSTOLIC BLOOD PRESSURE: 125 MMHG | WEIGHT: 237.3 LBS

## 2025-01-02 DIAGNOSIS — C50.912 MALIGNANT NEOPLASM OF LEFT FEMALE BREAST, UNSPECIFIED ESTROGEN RECEPTOR STATUS, UNSPECIFIED SITE OF BREAST (HCC): ICD-10-CM

## 2025-01-02 LAB
ALBUMIN SERPL-MCNC: 4.1 G/DL (ref 3.4–5)
ALBUMIN/GLOB SERPL: 1.6 {RATIO} (ref 1.1–2.2)
ALP SERPL-CCNC: 102 U/L (ref 40–129)
ALT SERPL-CCNC: 13 U/L (ref 10–40)
ANION GAP SERPL CALCULATED.3IONS-SCNC: 11 MMOL/L (ref 3–16)
AST SERPL-CCNC: 20 U/L (ref 15–37)
BILIRUB SERPL-MCNC: 0.3 MG/DL (ref 0–1)
BUN SERPL-MCNC: 19 MG/DL (ref 7–20)
CALCIUM SERPL-MCNC: 9 MG/DL (ref 8.3–10.6)
CHLORIDE SERPL-SCNC: 103 MMOL/L (ref 99–110)
CO2 SERPL-SCNC: 25 MMOL/L (ref 21–32)
CREAT SERPL-MCNC: 0.6 MG/DL (ref 0.6–1.2)
DEPRECATED RDW RBC AUTO: 13.7 % (ref 12.4–15.4)
GFR SERPLBLD CREATININE-BSD FMLA CKD-EPI: >90 ML/MIN/{1.73_M2}
GLUCOSE SERPL-MCNC: 109 MG/DL (ref 70–99)
HCT VFR BLD AUTO: 39.8 % (ref 36–48)
HGB BLD-MCNC: 13.6 G/DL (ref 12–16)
MCH RBC QN AUTO: 30.2 PG (ref 26–34)
MCHC RBC AUTO-ENTMCNC: 34.1 G/DL (ref 31–36)
MCV RBC AUTO: 88.7 FL (ref 80–100)
PLATELET # BLD AUTO: 240 K/UL (ref 135–450)
PMV BLD AUTO: 8.5 FL (ref 5–10.5)
POTASSIUM SERPL-SCNC: 3.8 MMOL/L (ref 3.5–5.1)
PROT SERPL-MCNC: 6.6 G/DL (ref 6.4–8.2)
RBC # BLD AUTO: 4.48 M/UL (ref 4–5.2)
SODIUM SERPL-SCNC: 139 MMOL/L (ref 136–145)
WBC # BLD AUTO: 6.4 K/UL (ref 4–11)

## 2025-01-02 PROCEDURE — 7100000011 HC PHASE II RECOVERY - ADDTL 15 MIN: Performed by: SURGERY

## 2025-01-02 PROCEDURE — 6360000002 HC RX W HCPCS: Performed by: SURGERY

## 2025-01-02 PROCEDURE — 2500000003 HC RX 250 WO HCPCS: Performed by: SURGERY

## 2025-01-02 PROCEDURE — 6370000000 HC RX 637 (ALT 250 FOR IP): Performed by: ANESTHESIOLOGY

## 2025-01-02 PROCEDURE — 7100000000 HC PACU RECOVERY - FIRST 15 MIN: Performed by: SURGERY

## 2025-01-02 PROCEDURE — 2580000003 HC RX 258

## 2025-01-02 PROCEDURE — 2709999900 HC NON-CHARGEABLE SUPPLY: Performed by: SURGERY

## 2025-01-02 PROCEDURE — 88305 TISSUE EXAM BY PATHOLOGIST: CPT

## 2025-01-02 PROCEDURE — 88342 IMHCHEM/IMCYTCHM 1ST ANTB: CPT

## 2025-01-02 PROCEDURE — 7100000010 HC PHASE II RECOVERY - FIRST 15 MIN: Performed by: SURGERY

## 2025-01-02 PROCEDURE — 3700000001 HC ADD 15 MINUTES (ANESTHESIA): Performed by: SURGERY

## 2025-01-02 PROCEDURE — 2580000003 HC RX 258: Performed by: SURGERY

## 2025-01-02 PROCEDURE — 7100000001 HC PACU RECOVERY - ADDTL 15 MIN: Performed by: SURGERY

## 2025-01-02 PROCEDURE — 3600000003 HC SURGERY LEVEL 3 BASE: Performed by: SURGERY

## 2025-01-02 PROCEDURE — 85027 COMPLETE CBC AUTOMATED: CPT

## 2025-01-02 PROCEDURE — 88307 TISSUE EXAM BY PATHOLOGIST: CPT

## 2025-01-02 PROCEDURE — 6360000002 HC RX W HCPCS

## 2025-01-02 PROCEDURE — 76098 X-RAY EXAM SURGICAL SPECIMEN: CPT

## 2025-01-02 PROCEDURE — 80053 COMPREHEN METABOLIC PANEL: CPT

## 2025-01-02 PROCEDURE — 3600000013 HC SURGERY LEVEL 3 ADDTL 15MIN: Performed by: SURGERY

## 2025-01-02 PROCEDURE — 3700000000 HC ANESTHESIA ATTENDED CARE: Performed by: SURGERY

## 2025-01-02 RX ORDER — SODIUM CHLORIDE 9 MG/ML
INJECTION, SOLUTION INTRAVENOUS PRN
Status: DISCONTINUED | OUTPATIENT
Start: 2025-01-02 | End: 2025-01-02 | Stop reason: SDUPTHER

## 2025-01-02 RX ORDER — LIDOCAINE HYDROCHLORIDE 10 MG/ML
INJECTION, SOLUTION INFILTRATION; PERINEURAL
Status: COMPLETED | OUTPATIENT
Start: 2025-01-02 | End: 2025-01-02

## 2025-01-02 RX ORDER — HYDROCODONE BITARTRATE AND ACETAMINOPHEN 5; 325 MG/1; MG/1
1 TABLET ORAL EVERY 6 HOURS PRN
Qty: 8 TABLET | Refills: 0 | Status: SHIPPED | OUTPATIENT
Start: 2025-01-02 | End: 2025-01-07

## 2025-01-02 RX ORDER — NALOXONE HYDROCHLORIDE 0.4 MG/ML
INJECTION, SOLUTION INTRAMUSCULAR; INTRAVENOUS; SUBCUTANEOUS PRN
Status: DISCONTINUED | OUTPATIENT
Start: 2025-01-02 | End: 2025-01-02 | Stop reason: HOSPADM

## 2025-01-02 RX ORDER — SODIUM CHLORIDE 9 MG/ML
INJECTION, SOLUTION INTRAVENOUS PRN
Status: DISCONTINUED | OUTPATIENT
Start: 2025-01-02 | End: 2025-01-02 | Stop reason: HOSPADM

## 2025-01-02 RX ORDER — SODIUM CHLORIDE 0.9 % (FLUSH) 0.9 %
5-40 SYRINGE (ML) INJECTION EVERY 12 HOURS SCHEDULED
Status: CANCELLED | OUTPATIENT
Start: 2025-01-02

## 2025-01-02 RX ORDER — SODIUM CHLORIDE 0.9 % (FLUSH) 0.9 %
5-40 SYRINGE (ML) INJECTION EVERY 12 HOURS SCHEDULED
Status: DISCONTINUED | OUTPATIENT
Start: 2025-01-02 | End: 2025-01-02 | Stop reason: HOSPADM

## 2025-01-02 RX ORDER — ONDANSETRON 2 MG/ML
4 INJECTION INTRAMUSCULAR; INTRAVENOUS
Status: DISCONTINUED | OUTPATIENT
Start: 2025-01-02 | End: 2025-01-02 | Stop reason: HOSPADM

## 2025-01-02 RX ORDER — ONDANSETRON 2 MG/ML
INJECTION INTRAMUSCULAR; INTRAVENOUS
Status: DISCONTINUED | OUTPATIENT
Start: 2025-01-02 | End: 2025-01-02 | Stop reason: SDUPTHER

## 2025-01-02 RX ORDER — FENTANYL CITRATE 0.05 MG/ML
25 INJECTION, SOLUTION INTRAMUSCULAR; INTRAVENOUS EVERY 5 MIN PRN
Status: DISCONTINUED | OUTPATIENT
Start: 2025-01-02 | End: 2025-01-02 | Stop reason: HOSPADM

## 2025-01-02 RX ORDER — FENTANYL CITRATE 50 UG/ML
INJECTION, SOLUTION INTRAMUSCULAR; INTRAVENOUS
Status: DISCONTINUED | OUTPATIENT
Start: 2025-01-02 | End: 2025-01-02 | Stop reason: SDUPTHER

## 2025-01-02 RX ORDER — PROPOFOL 10 MG/ML
INJECTION, EMULSION INTRAVENOUS
Status: DISCONTINUED | OUTPATIENT
Start: 2025-01-02 | End: 2025-01-02 | Stop reason: SDUPTHER

## 2025-01-02 RX ORDER — ONDANSETRON 4 MG/1
4 TABLET, FILM COATED ORAL EVERY 8 HOURS PRN
Qty: 2 TABLET | Refills: 1 | Status: SHIPPED | OUTPATIENT
Start: 2025-01-02

## 2025-01-02 RX ORDER — CEFAZOLIN SODIUM 1 G/3ML
INJECTION, POWDER, FOR SOLUTION INTRAMUSCULAR; INTRAVENOUS
Status: DISCONTINUED | OUTPATIENT
Start: 2025-01-02 | End: 2025-01-02 | Stop reason: SDUPTHER

## 2025-01-02 RX ORDER — SODIUM CHLORIDE 0.9 % (FLUSH) 0.9 %
5-40 SYRINGE (ML) INJECTION EVERY 12 HOURS SCHEDULED
Status: DISCONTINUED | OUTPATIENT
Start: 2025-01-02 | End: 2025-01-02 | Stop reason: SDUPTHER

## 2025-01-02 RX ORDER — LIDOCAINE HYDROCHLORIDE 20 MG/ML
INJECTION, SOLUTION EPIDURAL; INFILTRATION; INTRACAUDAL; PERINEURAL
Status: DISCONTINUED | OUTPATIENT
Start: 2025-01-02 | End: 2025-01-02 | Stop reason: SDUPTHER

## 2025-01-02 RX ORDER — SODIUM CHLORIDE 9 MG/ML
INJECTION, SOLUTION INTRAVENOUS
Status: DISCONTINUED | OUTPATIENT
Start: 2025-01-02 | End: 2025-01-02 | Stop reason: SDUPTHER

## 2025-01-02 RX ORDER — DEXAMETHASONE SODIUM PHOSPHATE 4 MG/ML
INJECTION, SOLUTION INTRA-ARTICULAR; INTRALESIONAL; INTRAMUSCULAR; INTRAVENOUS; SOFT TISSUE
Status: DISCONTINUED | OUTPATIENT
Start: 2025-01-02 | End: 2025-01-02 | Stop reason: SDUPTHER

## 2025-01-02 RX ORDER — SODIUM CHLORIDE 0.9 % (FLUSH) 0.9 %
5-40 SYRINGE (ML) INJECTION PRN
Status: DISCONTINUED | OUTPATIENT
Start: 2025-01-02 | End: 2025-01-02 | Stop reason: SDUPTHER

## 2025-01-02 RX ORDER — HYDROCODONE BITARTRATE AND ACETAMINOPHEN 5; 325 MG/1; MG/1
1 TABLET ORAL ONCE AS NEEDED
Status: COMPLETED | OUTPATIENT
Start: 2025-01-02 | End: 2025-01-02

## 2025-01-02 RX ORDER — SODIUM CHLORIDE 0.9 % (FLUSH) 0.9 %
5-40 SYRINGE (ML) INJECTION PRN
Status: DISCONTINUED | OUTPATIENT
Start: 2025-01-02 | End: 2025-01-02 | Stop reason: HOSPADM

## 2025-01-02 RX ORDER — SODIUM CHLORIDE 0.9 % (FLUSH) 0.9 %
5-40 SYRINGE (ML) INJECTION PRN
Status: CANCELLED | OUTPATIENT
Start: 2025-01-02

## 2025-01-02 RX ORDER — MAGNESIUM HYDROXIDE 1200 MG/15ML
LIQUID ORAL CONTINUOUS PRN
Status: DISCONTINUED | OUTPATIENT
Start: 2025-01-02 | End: 2025-01-02 | Stop reason: HOSPADM

## 2025-01-02 RX ORDER — SODIUM CHLORIDE 9 MG/ML
INJECTION, SOLUTION INTRAVENOUS PRN
Status: CANCELLED | OUTPATIENT
Start: 2025-01-02

## 2025-01-02 RX ORDER — CEPHALEXIN 500 MG/1
500 CAPSULE ORAL 2 TIMES DAILY
Qty: 14 CAPSULE | Refills: 0 | Status: SHIPPED | OUTPATIENT
Start: 2025-01-02

## 2025-01-02 RX ADMIN — HYDROCODONE BITARTRATE AND ACETAMINOPHEN 1 TABLET: 5; 325 TABLET ORAL at 09:24

## 2025-01-02 RX ADMIN — HYDROMORPHONE HYDROCHLORIDE 0.5 MG: 1 INJECTION, SOLUTION INTRAMUSCULAR; INTRAVENOUS; SUBCUTANEOUS at 08:02

## 2025-01-02 RX ADMIN — SODIUM CHLORIDE 2000 MG: 900 INJECTION INTRAVENOUS at 07:42

## 2025-01-02 RX ADMIN — FENTANYL CITRATE 50 MCG: 50 INJECTION INTRAMUSCULAR; INTRAVENOUS at 07:36

## 2025-01-02 RX ADMIN — FENTANYL CITRATE 25 MCG: 50 INJECTION INTRAMUSCULAR; INTRAVENOUS at 07:51

## 2025-01-02 RX ADMIN — CEFAZOLIN 1 G: 1 INJECTION, POWDER, FOR SOLUTION INTRAMUSCULAR; INTRAVENOUS at 07:51

## 2025-01-02 RX ADMIN — PROPOFOL 200 MG: 10 INJECTION, EMULSION INTRAVENOUS at 07:40

## 2025-01-02 RX ADMIN — SODIUM CHLORIDE: 9 INJECTION, SOLUTION INTRAVENOUS at 07:36

## 2025-01-02 RX ADMIN — FENTANYL CITRATE 25 MCG: 50 INJECTION INTRAMUSCULAR; INTRAVENOUS at 07:47

## 2025-01-02 RX ADMIN — ONDANSETRON 4 MG: 2 INJECTION INTRAMUSCULAR; INTRAVENOUS at 07:42

## 2025-01-02 RX ADMIN — DEXAMETHASONE SODIUM PHOSPHATE 8 MG: 4 INJECTION, SOLUTION INTRAMUSCULAR; INTRAVENOUS at 07:42

## 2025-01-02 RX ADMIN — LIDOCAINE HYDROCHLORIDE 100 MG: 20 INJECTION, SOLUTION EPIDURAL; INFILTRATION; INTRACAUDAL; PERINEURAL at 07:40

## 2025-01-02 ASSESSMENT — PAIN DESCRIPTION - ORIENTATION
ORIENTATION: LEFT

## 2025-01-02 ASSESSMENT — PAIN - FUNCTIONAL ASSESSMENT
PAIN_FUNCTIONAL_ASSESSMENT: PREVENTS OR INTERFERES SOME ACTIVE ACTIVITIES AND ADLS
PAIN_FUNCTIONAL_ASSESSMENT: 0-10
PAIN_FUNCTIONAL_ASSESSMENT: ACTIVITIES ARE NOT PREVENTED
PAIN_FUNCTIONAL_ASSESSMENT: 0-10
PAIN_FUNCTIONAL_ASSESSMENT: ACTIVITIES ARE NOT PREVENTED
PAIN_FUNCTIONAL_ASSESSMENT: PREVENTS OR INTERFERES SOME ACTIVE ACTIVITIES AND ADLS
PAIN_FUNCTIONAL_ASSESSMENT: 0-10
PAIN_FUNCTIONAL_ASSESSMENT: ACTIVITIES ARE NOT PREVENTED
PAIN_FUNCTIONAL_ASSESSMENT: 0-10

## 2025-01-02 ASSESSMENT — PAIN DESCRIPTION - DESCRIPTORS
DESCRIPTORS: DISCOMFORT
DESCRIPTORS: SHARP
DESCRIPTORS: DISCOMFORT

## 2025-01-02 ASSESSMENT — PAIN DESCRIPTION - FREQUENCY
FREQUENCY: INTERMITTENT
FREQUENCY: INTERMITTENT

## 2025-01-02 ASSESSMENT — PAIN DESCRIPTION - LOCATION
LOCATION: BREAST

## 2025-01-02 ASSESSMENT — PAIN DESCRIPTION - ONSET
ONSET: GRADUAL
ONSET: GRADUAL

## 2025-01-02 ASSESSMENT — PAIN SCALES - GENERAL
PAINLEVEL_OUTOF10: 2
PAINLEVEL_OUTOF10: 2
PAINLEVEL_OUTOF10: 4

## 2025-01-02 ASSESSMENT — PAIN DESCRIPTION - PAIN TYPE
TYPE: SURGICAL PAIN
TYPE: SURGICAL PAIN

## 2025-01-02 NOTE — ANESTHESIA PRE PROCEDURE
[facility] Department of Anesthesiology  Pre-Anesthesia Evaluation/Consultation       Name:  Irma Wheatley  : 1960  Age:  64 y.o.                                           MRN:  [id number]  Date: 2025           Surgeon: Surgeon(s):  Araseli Butler MD    Procedure: Procedure(s):  LEFT BREAST LUMPECTOMY WITH INTRAOPERATIVE SONOSITE; LEFT SENTINAL NODE BIOPSY (BLUE DYE ONLY)     No Known Allergies  Patient Active Problem List   Diagnosis    Syncope and collapse    Essential hypertension    YUE (obstructive sleep apnea)    Paroxysmal atrial fibrillation (HCC)     Past Medical History:   Diagnosis Date    A-fib (HCC)     Hypertension     Sleep apnea     wears cpap     Past Surgical History:   Procedure Laterality Date    CARDIAC ELECTROPHYSIOLOGY STUDY AND ABLATION  2023    TONSILLECTOMY      as a child     Social History     Tobacco Use    Smoking status: Never    Smokeless tobacco: Never   Vaping Use    Vaping status: Never Used   Substance Use Topics    Alcohol use: Yes     Comment: rarely    Drug use: Never     Medications  No current facility-administered medications on file prior to encounter.     Current Outpatient Medications on File Prior to Encounter   Medication Sig Dispense Refill    metoprolol succinate (TOPROL XL) 25 MG extended release tablet Take 1 tablet by mouth daily 90 tablet 3    lisinopril-hydroCHLOROthiazide (PRINZIDE;ZESTORETIC) 20-25 MG per tablet Take 1 tablet by mouth daily      apixaban (ELIQUIS) 5 MG TABS tablet Take 1 tablet by mouth 2 times daily 180 tablet 3     Current Facility-Administered Medications   Medication Dose Route Frequency Provider Last Rate Last Admin    sodium chloride flush 0.9 % injection 5-40 mL  5-40 mL IntraVENous 2 times per day Arturo Evangelista MD        sodium chloride flush 0.9 % injection 5-40 mL  5-40 mL IntraVENous PRN Arturo Evangelista MD        0.9 % sodium chloride infusion   IntraVENous PRN Arturo Evangelista MD        ceFAZolin (ANCEF) 2,000 mg

## 2025-01-02 NOTE — PROGRESS NOTES
Prescriptions delivered by Vidhya and verified pt's name and . Norco 1 tablet PO given for pain 4/10 in left breast. VSS. Discharge instructions reviewed with pt and . Demonstrated KIMBERLEY drain care. Emptied < 5 cc of bloody drainage from bulb. Both express an understanding of instructions. Up to chair. Pt dressing with 's assistance.    Semaglutide-Weight Management (WEGOVY) 1 MG/0.5ML pen  Last Written Prescription Date:   3/27/2023  Last Fill Quantity: 2,   # refills: 0  Last Office Visit :  3/1/2023  Future Office visit:  None    Routing refill request to provider for review/approval because:  Med refused  Pt needs updated office visit  Scheduling notified       Johanne Deleon RN  Central Triage Red Flags/Med Refills

## 2025-01-02 NOTE — FLOWSHEET NOTE
Pt. Arrived in PACU.  Oral airway removed by CRNA.  Opens eyes, denies pain.  KIMBERLEY drain intact and compressed.

## 2025-01-02 NOTE — DISCHARGE INSTRUCTIONS
1. Remove dressings 1/4/2025, leave steristrips in place  2. Ice pack to left breast  3. Record drain output 2-3 times daily, Strip drain when emptying  4. Do not rinse bulb or tubing!  5. No driving x 1-2 days   6. No lifting > 10 lbs with left arm while drain in place  7. Call office on 1/7/2025 to report drain output, follow up will be scheduled at that time.  [phone]  8. Wear bra if comfortable  9. May shower  10. Call if problems or questions [phone]  11. May cover drain site with gauze and tape as needed  12. OK to resume Eliquis today 1/2/25

## 2025-01-02 NOTE — BRIEF OP NOTE
Brief Postoperative Note    Name           : Irma Wheatley  YOB: 1960  MRN             : [id number]    Pre-operative Diagnosis: 1. Left breast cancer UOQ    Post-operative Diagnosis: Same    Procedure: 1. Left breast lumpectomy with intra-op sonosite  2. Left deep sentinel node biopsy    Anesthesia: General and Local    Surgeons/Assistants: Carrie    Estimated Blood Loss: less than 50     Complications: None    Specimens: Was Obtained: 1. Left breast mass 0200  2. Superior margin  3. Inferior margin  4. Medial margin  5. Lateral margin  6. Posterior margin  7. Left sentinel nodes    Drains: 10 mm shantanu x 1      Findings: same as postop    Electronically signed by Araseli Butler MD on 1/2/2025 at 8:22 AM

## 2025-01-02 NOTE — FLOWSHEET NOTE
Dr. Butler here to speak with pt.  Pt. Said she wanted prescriptions sent to retail pharmacy. Clarified when she can resume Eliquis.

## 2025-01-02 NOTE — PROGRESS NOTES
Pt awake on arrival to phase II. VSS. Discomfort 3/10 in left breast and tolerable per pt. Ice pack to left breast. Given juice and call light. Texted  to come in.

## 2025-01-02 NOTE — ANESTHESIA POSTPROCEDURE EVALUATION
[facility] [place] Department of Anesthesiology  Post-Anesthesia Note       Name:  Irma Wheatley                                  Age:  64 y.o.  MRN:  [id number]     Last Vitals & Oxygen Saturation: /66   Pulse 61   Temp 96.8 °F (36 °C)   Resp 13   Ht 1.6 m (5' 3\")   Wt 107.6 kg (237 lb 4.8 oz)   SpO2 99%   BMI 42.04 kg/m²   Patient Vitals for the past 4 hrs:   BP Temp Temp src Pulse Resp SpO2 Height Weight   01/02/25 0855 115/66 96.8 °F (36 °C) -- 61 13 99 % -- --   01/02/25 0853 -- -- -- 60 13 -- -- --   01/02/25 0850 (!) 110/53 -- -- 61 15 98 % -- --   01/02/25 0845 (!) 110/53 -- -- 63 16 99 % -- --   01/02/25 0840 (!) 106/56 -- -- 61 15 98 % -- --   01/02/25 0835 (!) 103/55 -- -- 61 10 98 % -- --   01/02/25 0830 (!) 106/57 -- -- 64 15 98 % -- --   01/02/25 0827 (!) 108/55 96.8 °F (36 °C) -- 69 14 100 % -- --   01/02/25 0624 (!) 145/74 98.4 °F (36.9 °C) Oral 72 16 97 % -- --   01/02/25 0614 -- -- -- -- -- -- 1.6 m (5' 3\") 107.6 kg (237 lb 4.8 oz)       Level of consciousness:  Awake, alert    Respiratory: Respirations easy, no distress. Stable.    Cardiovascular: Hemodynamically stable.    Hydration: Adequate.    PONV: Adequately managed.    Post-op pain: Adequately controlled.    Post-op assessment: Tolerated anesthetic well without complication.    Complications:  None.    Eusebio Metz MD  January 2, 2025   9:02 AM

## 2025-01-02 NOTE — H&P
Date of Surgery Update:  Irma Wheatley was seen, history and physical examination reviewed, and patient examined by me today. There have been no significant clinical changes since the completion of the previous history and physical. The surgical site was confirmed by the patient and me.     I have presented reasonable alternatives to the patient's proposed care, treatment, and services. The discussion I have done encompassed risks, benefits, and side effects related to the alternatives and the risks related to not receiving the proposed care, treatment, and services.     All questions answered. Patient wishes to proceed.     Electronically signed by: Araseli Butler MD,1/2/2025,8:39 AM

## 2025-01-03 NOTE — OP NOTE
removed. Yes   All significantly radioactive nodes were removed. N/A   All palpably suspicious nodes were removed. N/A   Biopsy-proven positive nodes marked with clips prior to chemotherapy were identified and removed. N/A       Indications for procedure:  The patient is a 64-year-old female who was found to have a suspicious density in the upper outer aspect of her left breast on mammogram.  Ultrasound showed the presence of a solid mass.  Core biopsy of this lesion revealed an infiltrating ductal carcinoma.  After discussing treatment options she is opted for breast conservation and presents today for curative surgery.    Detailed Description of Procedure:   Patient was brought to the operating room placed on the OR table in supine position.  Following the induction of general anesthesia 2 cc methylene blue were injected at the 1 o'clock position of the left breast along the areolar border.  An ultrasound was performed and the density with clip was identified at the 2 o'clock position of the left breast 5 to 6 cm from the nipple.  A michele was made on the skin overlying the lesion.  The left breast was prepped with Betadine and draped in the usual sterile manner.    An elliptical incision was made overlying the tumor at the 2 o'clock position of the left breast.  Dissection was carried down to the subcutaneous tissue until the biopsy site was identified.  This area was removed with a rim of normal surrounding breast tissue.  The specimen was marked with a short suture on the superior margin long suture on the lateral margin.  An x-ray was performed and the specimen contained the targeted clip with density in the previous biopsy clip.  Additional tissue was taken from the superior inferior medial lateral and posterior margins.      The axilla was accessible through the lumpectomy incision.  The clavipectoral fascia was opened and a blue stained lymphatic channel was followed distally to a blue stained lymph node.  2

## 2025-02-17 ENCOUNTER — HOSPITAL ENCOUNTER (OUTPATIENT)
Dept: WOMENS IMAGING | Age: 65
Discharge: HOME | End: 2025-02-17
Attending: INTERNAL MEDICINE
Payer: COMMERCIAL

## 2025-02-17 DIAGNOSIS — M81.0 POST-MENOPAUSAL OSTEOPOROSIS: ICD-10-CM

## 2025-02-17 PROCEDURE — 77080 DXA BONE DENSITY AXIAL: CPT

## 2025-04-14 RX ORDER — METOPROLOL SUCCINATE 25 MG/1
25 TABLET, EXTENDED RELEASE ORAL DAILY
Qty: 30 TABLET | Refills: 0 | Status: SHIPPED | OUTPATIENT
Start: 2025-04-14 | End: 2025-04-16 | Stop reason: SDUPTHER

## 2025-04-14 NOTE — TELEPHONE ENCOUNTER
Last OV: 05/01/24  Next OV: 05/09/25  Last refill:05/01/24  Most recent Labs: CMP 01/02/25  Last EKG (if needed):05/01/24

## 2025-04-15 ENCOUNTER — TELEPHONE (OUTPATIENT)
Dept: CARDIOLOGY CLINIC | Age: 65
End: 2025-04-15

## 2025-04-15 RX ORDER — METOPROLOL SUCCINATE 25 MG/1
25 TABLET, EXTENDED RELEASE ORAL DAILY
OUTPATIENT
Start: 2025-04-15

## 2025-04-16 RX ORDER — METOPROLOL SUCCINATE 25 MG/1
25 TABLET, EXTENDED RELEASE ORAL DAILY
Qty: 90 TABLET | Refills: 1 | Status: SHIPPED | OUTPATIENT
Start: 2025-04-16

## 2025-04-16 NOTE — TELEPHONE ENCOUNTER
Irma called relaying medication was received and denied for 90 day supply. Reordered as a 30 day supply and denied again, per Marvel. Irma is wanting to know if she is to continue with this medication?     Will it be ordered as a 30 day supply, prior to f/u 5/9 with Ema? If so, please send to local pharmacy. If patient is to continue taking and can receive a 90 day supply, she would like to continue using [employer].    Please advise.

## 2025-05-08 NOTE — PROGRESS NOTES
[facility]   Electrophysiology Outpatient Note              Date:  May 9, 2025  Patient name: Irma Wheatley  YOB: 1960    Primary Care physician: Lyndsey Mendiola MD    HISTORY OF PRESENT ILLNESS: The patient is a 64 y.o. female with a history of atrial fibrillation, YUE and hypertension.    7/30/2023 she presented to [facility] ED following a syncopal episode.  She endorsed palpitations the night prior.  On admission EKG showed sinus rhythm without ischemia.  Orthostatic vitals negative.  Echo and 30-day monitor ordered    8/3/2023 urgent report received from event monitor revealing atrial fibrillation with rapid ventricular rate at 195 bpm with similar symptoms of lightheadedness.  She was started on Eliquis 5 mg twice daily and Toprol XL 25 mg daily.    8/15/2023 additional urgent report received with atrial fibrillation and ventricular rate 192 bpm.  Patient felt racing heart and diaphoresis thought to be due to heat and humidity.    Office visit 8/16/2023 patient had similar complaints of racing heart, palpitations, shortness of breath, and diaphoresis while recently at the [email or web address] store.  Due to Holter showing A-fib RVR and current symptomatic nature she was started on flecainide 50 twice daily and metoprolol was decreased to 12.5 mg daily with plans to proceed for ablation    9/21/2023 she underwent EP study with RFA of atrial fibrillation and PVI with additional ablation of CFAE with Dr. Joshi.    Follow-up post ablation on 1/16/2024 her flecainide was stopped and 30-day monitor was placed to assess for baseline rhythm off AAD.  Monitor 1/16 - 2/14/2024 showed sinus rhythm with noted bradycardia during sleep.    Last office visit with Dr. Joshi on 5/1/2024 EKG showed sinus rhythm with ventricular rate 62 bpm.  She reported feeling well without any known atrial fibrillation recurrence.  She was to continue Eliquis 5 mg twice daily and Toprol 25 mg daily and

## 2025-05-09 ENCOUNTER — OFFICE VISIT (OUTPATIENT)
Dept: CARDIOLOGY CLINIC | Age: 65
End: 2025-05-09

## 2025-05-09 VITALS
HEIGHT: 63 IN | WEIGHT: 232.8 LBS | HEART RATE: 67 BPM | DIASTOLIC BLOOD PRESSURE: 70 MMHG | SYSTOLIC BLOOD PRESSURE: 110 MMHG | BODY MASS INDEX: 41.25 KG/M2 | OXYGEN SATURATION: 97 %

## 2025-05-09 DIAGNOSIS — I10 ESSENTIAL HYPERTENSION: ICD-10-CM

## 2025-05-09 DIAGNOSIS — R55 SYNCOPE AND COLLAPSE: ICD-10-CM

## 2025-05-09 DIAGNOSIS — I48.0 PAROXYSMAL ATRIAL FIBRILLATION (HCC): Primary | ICD-10-CM

## 2025-05-09 RX ORDER — LETROZOLE 2.5 MG/1
2.5 TABLET, FILM COATED ORAL
COMMUNITY
Start: 2025-02-24

## 2025-07-10 ENCOUNTER — HOSPITAL ENCOUNTER (OUTPATIENT)
Dept: WOMENS IMAGING | Age: 65
Discharge: HOME | End: 2025-07-10
Payer: COMMERCIAL

## 2025-07-10 VITALS — WEIGHT: 232 LBS | BODY MASS INDEX: 42.69 KG/M2 | HEIGHT: 62 IN

## 2025-07-10 DIAGNOSIS — Z12.31 BREAST CANCER SCREENING BY MAMMOGRAM: ICD-10-CM

## 2025-07-10 PROCEDURE — G0279 TOMOSYNTHESIS, MAMMO: HCPCS

## (undated) DEVICE — AEGIS 1" DISK 4MM HOLE, PEEL OPEN: Brand: MEDLINE

## (undated) DEVICE — TRANSFER SET 3": Brand: MEDLINE INDUSTRIES, INC.

## (undated) DEVICE — CLEANER,CAUTERY TIP,2X2",STERILE: Brand: MEDLINE

## (undated) DEVICE — PREMIUM DRY TRAY LF: Brand: MEDLINE INDUSTRIES, INC.

## (undated) DEVICE — SPONGE GZ W4XL4IN COT 12 PLY TYP VII WVN C FLD DSGN STERILE

## (undated) DEVICE — TOWEL,OR,DSP,ST,BLUE,STD,4/PK,20PK/CS: Brand: MEDLINE

## (undated) DEVICE — GOWN SIRUS NONREIN XL W/TWL: Brand: MEDLINE INDUSTRIES, INC.

## (undated) DEVICE — MINOR SET UP: Brand: MEDLINE INDUSTRIES, INC.

## (undated) DEVICE — NEEDLE HYPO 25GA L1.5IN BVL ORIENTED ECLIPSE

## (undated) DEVICE — DRAIN SURG TB WND FLAT W/O TRCR SIL NO RESVR HUBLESS RADPQ

## (undated) DEVICE — CLIP LIG M TI 6 SIL HNDL FOR OPN AND ENDOSCP SGL APPL

## (undated) DEVICE — STRIP,CLOSURE,WOUND,MEDI-STRIP,1/2X4: Brand: MEDLINE

## (undated) DEVICE — SUTURE PERMAHAND SZ 2-0 L18IN NONABSORBABLE BLK L26MM FS 685G

## (undated) DEVICE — RESERVOIR,SUCTION,100CC,SILICONE: Brand: MEDLINE

## (undated) DEVICE — 3M™ TEGADERM™ TRANSPARENT FILM DRESSING FRAME STYLE, 1626W, 4 IN X 4-3/4 IN (10 CM X 12 CM), 50/CT 4CT/CASE: Brand: 3M™ TEGADERM™

## (undated) DEVICE — SUTURE ABSORBABLE L 18 IN SZ 4-0 NDL L 19 MM POLYGLACTIN 910 36/BX

## (undated) DEVICE — ELECTRODE PT RET AD L9FT HI MOIST COND ADH HYDRGEL CORDED

## (undated) DEVICE — MASTISOL ADHESIVE LIQ 2/3ML

## (undated) DEVICE — DRAPE,CHEST,FENES,15X10,STERIL: Brand: MEDLINE

## (undated) DEVICE — GAUZE,SPONGE,4"X4",16PLY,XRAY,STRL,LF: Brand: MEDLINE

## (undated) DEVICE — SYRINGE MED 10ML LUERLOCK TIP W/O SFTY DISP

## (undated) DEVICE — SOLUTION PREP PAINT POV IOD FOR SKIN MUCOUS MEM

## (undated) DEVICE — GLOVE,SURG,SENSICARE SLT,LF,PF,7: Brand: MEDLINE

## (undated) DEVICE — SOLUTION IRRIG 1000ML 0.9% SOD CHL USP POUR PLAS BTL

## (undated) DEVICE — SUTURE VICRYL + SZ 3-0 L27IN ABSRB UD L26MM SH 1/2 CIR VCP416H

## (undated) DEVICE — SOLUTION SCRB 4OZ 7.5% POVIDONE IOD ANTIMIC BTL